# Patient Record
Sex: MALE | Race: WHITE | NOT HISPANIC OR LATINO | URBAN - METROPOLITAN AREA
[De-identification: names, ages, dates, MRNs, and addresses within clinical notes are randomized per-mention and may not be internally consistent; named-entity substitution may affect disease eponyms.]

---

## 2021-04-15 ENCOUNTER — INPATIENT (INPATIENT)
Facility: HOSPITAL | Age: 76
LOS: 4 days | Discharge: HOME | End: 2021-04-20
Attending: INTERNAL MEDICINE | Admitting: INTERNAL MEDICINE
Payer: MEDICARE

## 2021-04-15 VITALS
OXYGEN SATURATION: 100 % | DIASTOLIC BLOOD PRESSURE: 84 MMHG | RESPIRATION RATE: 20 BRPM | SYSTOLIC BLOOD PRESSURE: 135 MMHG | TEMPERATURE: 98 F | HEART RATE: 60 BPM

## 2021-04-15 DIAGNOSIS — R63.6 UNDERWEIGHT: ICD-10-CM

## 2021-04-15 DIAGNOSIS — Z90.79 ACQUIRED ABSENCE OF OTHER GENITAL ORGAN(S): Chronic | ICD-10-CM

## 2021-04-15 DIAGNOSIS — Z90.6 ACQUIRED ABSENCE OF OTHER PARTS OF URINARY TRACT: Chronic | ICD-10-CM

## 2021-04-15 LAB
ALBUMIN SERPL ELPH-MCNC: 4.1 G/DL — SIGNIFICANT CHANGE UP (ref 3.5–5.2)
ALP SERPL-CCNC: 67 U/L — SIGNIFICANT CHANGE UP (ref 30–115)
ALT FLD-CCNC: 19 U/L — SIGNIFICANT CHANGE UP (ref 0–41)
ANION GAP SERPL CALC-SCNC: 10 MMOL/L — SIGNIFICANT CHANGE UP (ref 7–14)
AST SERPL-CCNC: 28 U/L — SIGNIFICANT CHANGE UP (ref 0–41)
BASOPHILS # BLD AUTO: 0 K/UL — SIGNIFICANT CHANGE UP (ref 0–0.2)
BASOPHILS NFR BLD AUTO: 0 % — SIGNIFICANT CHANGE UP (ref 0–1)
BILIRUB SERPL-MCNC: 0.4 MG/DL — SIGNIFICANT CHANGE UP (ref 0.2–1.2)
BLD GP AB SCN SERPL QL: SIGNIFICANT CHANGE UP
BLD GP AB SCN SERPL QL: SIGNIFICANT CHANGE UP
BUN SERPL-MCNC: 50 MG/DL — HIGH (ref 10–20)
CALCIUM SERPL-MCNC: 9 MG/DL — SIGNIFICANT CHANGE UP (ref 8.5–10.1)
CHLORIDE SERPL-SCNC: 105 MMOL/L — SIGNIFICANT CHANGE UP (ref 98–110)
CO2 SERPL-SCNC: 24 MMOL/L — SIGNIFICANT CHANGE UP (ref 17–32)
CREAT SERPL-MCNC: 1.5 MG/DL — SIGNIFICANT CHANGE UP (ref 0.7–1.5)
EOSINOPHIL # BLD AUTO: 0.01 K/UL — SIGNIFICANT CHANGE UP (ref 0–0.7)
EOSINOPHIL NFR BLD AUTO: 0.2 % — SIGNIFICANT CHANGE UP (ref 0–8)
GLUCOSE SERPL-MCNC: 109 MG/DL — HIGH (ref 70–99)
HCT VFR BLD CALC: 24 % — LOW (ref 42–52)
HGB BLD-MCNC: 7.6 G/DL — LOW (ref 14–18)
IMM GRANULOCYTES NFR BLD AUTO: 0.6 % — HIGH (ref 0.1–0.3)
INR BLD: 1.13 RATIO — SIGNIFICANT CHANGE UP (ref 0.65–1.3)
LACTATE SERPL-SCNC: 2.8 MMOL/L — HIGH (ref 0.7–2)
LIDOCAIN IGE QN: 42 U/L — SIGNIFICANT CHANGE UP (ref 7–60)
LYMPHOCYTES # BLD AUTO: 1.33 K/UL — SIGNIFICANT CHANGE UP (ref 1.2–3.4)
LYMPHOCYTES # BLD AUTO: 20.9 % — SIGNIFICANT CHANGE UP (ref 20.5–51.1)
MCHC RBC-ENTMCNC: 24 PG — LOW (ref 27–31)
MCHC RBC-ENTMCNC: 31.7 G/DL — LOW (ref 32–37)
MCV RBC AUTO: 75.7 FL — LOW (ref 80–94)
MONOCYTES # BLD AUTO: 0.41 K/UL — SIGNIFICANT CHANGE UP (ref 0.1–0.6)
MONOCYTES NFR BLD AUTO: 6.5 % — SIGNIFICANT CHANGE UP (ref 1.7–9.3)
NEUTROPHILS # BLD AUTO: 4.56 K/UL — SIGNIFICANT CHANGE UP (ref 1.4–6.5)
NEUTROPHILS NFR BLD AUTO: 71.8 % — SIGNIFICANT CHANGE UP (ref 42.2–75.2)
NRBC # BLD: 0 /100 WBCS — SIGNIFICANT CHANGE UP (ref 0–0)
PLATELET # BLD AUTO: 127 K/UL — LOW (ref 130–400)
POTASSIUM SERPL-MCNC: 4.6 MMOL/L — SIGNIFICANT CHANGE UP (ref 3.5–5)
POTASSIUM SERPL-SCNC: 4.6 MMOL/L — SIGNIFICANT CHANGE UP (ref 3.5–5)
PROT SERPL-MCNC: 6.6 G/DL — SIGNIFICANT CHANGE UP (ref 6–8)
PROTHROM AB SERPL-ACNC: 13 SEC — HIGH (ref 9.95–12.87)
RBC # BLD: 3.17 M/UL — LOW (ref 4.7–6.1)
RBC # FLD: 16.5 % — HIGH (ref 11.5–14.5)
SARS-COV-2 RNA SPEC QL NAA+PROBE: SIGNIFICANT CHANGE UP
SODIUM SERPL-SCNC: 139 MMOL/L — SIGNIFICANT CHANGE UP (ref 135–146)
WBC # BLD: 6.35 K/UL — SIGNIFICANT CHANGE UP (ref 4.8–10.8)
WBC # FLD AUTO: 6.35 K/UL — SIGNIFICANT CHANGE UP (ref 4.8–10.8)

## 2021-04-15 PROCEDURE — 99285 EMERGENCY DEPT VISIT HI MDM: CPT | Mod: CS,GC

## 2021-04-15 PROCEDURE — 93010 ELECTROCARDIOGRAM REPORT: CPT

## 2021-04-15 PROCEDURE — 99223 1ST HOSP IP/OBS HIGH 75: CPT | Mod: 25

## 2021-04-15 PROCEDURE — 74177 CT ABD & PELVIS W/CONTRAST: CPT | Mod: 26,MH

## 2021-04-15 PROCEDURE — 99406 BEHAV CHNG SMOKING 3-10 MIN: CPT

## 2021-04-15 RX ORDER — ONDANSETRON 8 MG/1
4 TABLET, FILM COATED ORAL ONCE
Refills: 0 | Status: COMPLETED | OUTPATIENT
Start: 2021-04-15 | End: 2021-04-15

## 2021-04-15 RX ORDER — MORPHINE SULFATE 50 MG/1
4 CAPSULE, EXTENDED RELEASE ORAL ONCE
Refills: 0 | Status: DISCONTINUED | OUTPATIENT
Start: 2021-04-15 | End: 2021-04-15

## 2021-04-15 RX ORDER — PANTOPRAZOLE SODIUM 20 MG/1
8 TABLET, DELAYED RELEASE ORAL
Qty: 80 | Refills: 0 | Status: DISCONTINUED | OUTPATIENT
Start: 2021-04-15 | End: 2021-04-16

## 2021-04-15 RX ORDER — SODIUM CHLORIDE 9 MG/ML
1000 INJECTION, SOLUTION INTRAVENOUS
Refills: 0 | Status: DISCONTINUED | OUTPATIENT
Start: 2021-04-15 | End: 2021-04-18

## 2021-04-15 RX ORDER — SODIUM CHLORIDE 9 MG/ML
1000 INJECTION INTRAMUSCULAR; INTRAVENOUS; SUBCUTANEOUS ONCE
Refills: 0 | Status: COMPLETED | OUTPATIENT
Start: 2021-04-15 | End: 2021-04-15

## 2021-04-15 RX ADMIN — SODIUM CHLORIDE 60 MILLILITER(S): 9 INJECTION, SOLUTION INTRAVENOUS at 19:20

## 2021-04-15 RX ADMIN — PANTOPRAZOLE SODIUM 10 MG/HR: 20 TABLET, DELAYED RELEASE ORAL at 23:27

## 2021-04-15 RX ADMIN — SODIUM CHLORIDE 2000 MILLILITER(S): 9 INJECTION INTRAMUSCULAR; INTRAVENOUS; SUBCUTANEOUS at 12:57

## 2021-04-15 RX ADMIN — MORPHINE SULFATE 4 MILLIGRAM(S): 50 CAPSULE, EXTENDED RELEASE ORAL at 12:57

## 2021-04-15 RX ADMIN — ONDANSETRON 4 MILLIGRAM(S): 8 TABLET, FILM COATED ORAL at 12:57

## 2021-04-15 NOTE — ED PROVIDER NOTE - NS ED ROS FT
Eyes:  No visual changes, eye pain or discharge.  ENMT:  No hearing changes, pain, no sore throat or runny nose, no difficulty swallowing  Cardiac:  No chest pain, SOB or edema. No chest pain with exertion.  Respiratory:  No cough or respiratory distress.    GI:  + large melonic bowel movements with Bright red blood as well x1.5 weeks. + Dry heaves, No nausea, diarrhea or abdominal pain.  :  urostomy bag  MS: No generalized weakness, No myalgia, joint pain or back pain.  Neuro:  No headache or weakness.  No LOC.  Skin:  No skin rash.   Endocrine: No history of thyroid disease or diabetes.

## 2021-04-15 NOTE — H&P ADULT - NSHPPHYSICALEXAM_GEN_ALL_CORE
General: WN/WD, appearing slightly pale  Neurology: A&Ox3, nonfocal, DE DIOS x 4  Head:  Normocephalic, atraumatic  ENT:  Mucosa moist, no ulcerations  Neck:  Supple, no sinuses or palpable masses  Lymphatic:  No palpable cervical, supraclavicular adenopathy  CV: RRR, S1S2, 3/6 systolic murmur  Abdominal: Soft, NT, ND no palpable mass, has ostomy bag in suprapubic region, JAGUAR was positive  MSK: No edema

## 2021-04-15 NOTE — H&P ADULT - NSHPLABSRESULTS_GEN_ALL_CORE
LABS/RADIOLOGY RESULTS:                          7.6    6.35  )-----------( 127      ( 15 Apr 2021 13:25 )             24.0   04-15    139  |  105  |  50<H>  ----------------------------<  109<H>  4.6   |  24  |  1.5    Ca    9.0      15 Apr 2021 13:25    TPro  6.6  /  Alb  4.1  /  TBili  0.4  /  DBili  x   /  AST  28  /  ALT  19  /  AlkPhos  67  04-15  Blood Cultures

## 2021-04-15 NOTE — ED PROVIDER NOTE - ATTENDING CONTRIBUTION TO CARE
76 yo M pmh as stated in chart pw rectal bleeding. Rectal bleeding since his surgery to remove his bladder. Has been worse the past few days and associated with generalized fatigue and lower abd pain. No fever/chills, no decrease uop, no hematuria, no skin changes around urostomy, no n/v, no palpitations, no cp, no sob, no headache.     CONSTITUTIONAL: Well-developed; well-nourished; in no acute distress. Sitting up and providing appropriate history and physical examination  SKIN: skin exam is warm and dry, no acute rash.  HEAD: Normocephalic; atraumatic.  EYES: PERRL, 3 mm bilateral, no nystagmus, EOM intact; conjunctiva and sclera clear.  ENT: No nasal discharge; airway clear.  NECK: Supple; non tender. + full passive ROM in all directions. No JVD  CARD: S1, S2 normal; no murmurs, gallops, or rubs. Regular rate and rhythm. + Symmetric Strong Pulses  RESP: No wheezes, rales or rhonchi. Good air movement bilaterally  ABD: +ttp over bilateral lower abd. soft; non-distended. No Rebound, No Guarding, No signs of peritonitis, No CVA tenderness. No pulsatile abdominal mass. + Strong and Symmetric Pulses. +melena on rectal exam. Urostomy in place draining normal colored urine, no skin changes or breakdown around urostomy site.  EXT: Normal ROM. No clubbing, cyanosis or edema. Dp and Pt Pulses intact. Cap refill less than 3 seconds  NEURO: CN 2-12 intact, normal finger to nose, normal romberg, stable gait, no sensory or motor deficits, Alert, oriented, grossly unremarkable. No Focal deficits. GCS 15. NIH 0  PSYCH: Cooperative, appropriate.

## 2021-04-15 NOTE — H&P ADULT - ATTENDING COMMENTS
74 YO M with a PMH of bladder/prostate CA s/p cystectomy and prostatectomy with removal of some lymph nodes who presents to the hospital with a c/o generalized weakness for the past x 1.5 weeks. Associated with melena and lower ABD pain. Denies any fevers/chills, hematemesis, or hematuria. In the ED, JAGUAR was positive for melena, Hgb is 7.6, T&S obtained, pt transfused 1 unit of PRBCs, and started on PPI. CT-AP w/ IV contrast was negative for acute process.     Physical exam shows pt in NAD. VSS, afebrile, not hypoxic on RA. A&Ox3. Non-focal neuro exam. Muscle strength/sensation intact. CTA B/L with no W/C/R. RRR, systolic murmur. ABD is soft and non-tender, normoactive BSs; ostomy in place. LEs without swelling. No rashes. Labs and radiology as above.     Microcytic anemia due to upper GIB. HD stable. IVFs (LR). Trend CBC. Anemia studies. PPI. Active T&S. Transfuse PRN for Hgb < 7. Two large bore IVs. NPO. GI consult.     DUSTIN vs CKD3, likely pre-renal; Doubt ATN. Send UA, urine lytes, urine pr:cr ratio, and trend BMP. IVFs (LR). Monitor urinary out-put. Hold nephrotoxic agents.     Lactate elevation. IVFs (LR). Repeat lactate level in the AM.     HX of bladder/prostate CA s/p cystectomy and prostatectomy with removal of some lymph nodes. Restart home meds, except as stated above. DVT PPX. Inform PCP of pt's admission to hospital. My note supersedes the residents note. 76 YO M with a PMH of bladder/prostate CA s/p cystectomy and prostatectomy with removal of some lymph nodes who presents to the hospital with a c/o generalized weakness for the past x 1.5 weeks. Associated with melena and lower ABD pain. Denies any fevers/chills, hematemesis, or hematuria. In the ED, JAGUAR was positive for melena, Hgb is 7.6, T&S obtained, pt transfused 1 unit of PRBCs, and started on PPI. CT-AP w/ IV contrast was negative for acute process.     Physical exam shows pt in NAD. VSS, afebrile, not hypoxic on RA. A&Ox3. Non-focal neuro exam. Muscle strength/sensation intact. CTA B/L with no W/C/R. RRR, systolic murmur. ABD is soft and non-tender, normoactive BSs; ostomy in place. LEs without swelling. No rashes. Labs and radiology as above.     Microcytic anemia due to upper GIB. HD stable. IVFs (LR). Trend CBC. Anemia studies. PPI. Active T&S. Transfuse PRN for Hgb < 7. Two large bore IVs. NPO. GI consult.     DUSTIN vs CKD3, likely pre-renal; Doubt ATN. Send UA, urine lytes, urine pr:cr ratio, and trend BMP. IVFs (LR). Monitor urinary out-put. Hold nephrotoxic agents.     Lactate elevation. IVFs (LR). Repeat lactate level in the AM.     Tobacco abuse with counseling. Pt extensively counseled on the benefits of smoking cessation and alternative therapies. Counseled for 15-20 minutes. Pt would like to think about their options prior to making a decision.     HX of bladder/prostate CA s/p cystectomy and prostatectomy with removal of some lymph nodes. Restart home meds, except as stated above. DVT PPX. Inform PCP of pt's admission to hospital. My note supersedes the residents note.

## 2021-04-15 NOTE — ED PROVIDER NOTE - PHYSICAL EXAMINATION
CONSTITUTIONAL: Well-developed; well-nourished; intermittent visible discomfort with intermittent dry heaves observed.   SKIN: warm, dry  HEAD: Normocephalic; atraumatic.  EYES: EOMI, pale conjuctiva.   ENT: No nasal discharge; airway clear.  NECK: Supple; non tender.  CARD: S1, S2 normal;  Regular rate and rhythm.   RESP: No wheezes, rales or rhonchi.  ABD: soft, + ttp RLQ. non distended, no rebound or guarding, urostomy RLQ  EXT: Normal ROM.  5/5 strength in all 4 extremities   LYMPH: No acute cervical adenopathy.  NEURO: Alert, oriented, grossly unremarkable. neurovascularly intact  PSYCH: Cooperative, appropriate.

## 2021-04-15 NOTE — H&P ADULT - ASSESSMENT
75 year old male with history of bladder and prostate cancer s/p cystectomy and prostatectomy with removal of some lymph nodes presents with generalized weakness and black stools    # Generalized weakness and black stools likely secondary to symptomatic anemia from UGIB  - hgb 7.6, no baseline // patient states he has gotten BRBPR on and off for years however he has been having hard black stools for a week and a half // never had EGD and colonoscopy, had positive JAGUAR in the ED // no hematemesis  - BUN 50, patient states he has not been eating very much and not on diuretics, most likely secondary to UGIB  - started on protonix infusion, will keep NPO after midnight for possible EGD tomorrow  - will transfuse 1 unit prbc as requested by GI, anemia appears symptomatic  - starting LR at 60, lactate 2.8 // HD stable  - anemia microcytic, likely component of chronic GI bleed    # Hx of bladder and prostate cancer s/p cystectomy and prostatectomy  - no suprapubic pain, ostomy bag draining yellow urine, f/u PSA    # Thrombocytopenia  - splenomegaly seen on CT, thrombocytopenia likely component of consumption from blood loss and possibly sequestration  - will treat bleeding for now and monitor    # Systolic murmur  - 3/6 murmur heard on exam, patient does not follow with any doctors  - will order 2d echo to help guide fluid resuscitation, should not delay EGD  - f/u EKG    PLAN: give one unit prbc, EGD in AM    # DVT PPX: SCDs  # GI PPX: protonix drip  # Diet: Clear fluids for now  FULL CODE

## 2021-04-15 NOTE — H&P ADULT - NSHPSOCIALHISTORY_GEN_ALL_CORE
former cigarette smoker for 60 years, currently smokes about 3 joints of marijuana a day, no alcohol use, no illicit drug abuse

## 2021-04-15 NOTE — ED PROVIDER NOTE - OBJECTIVE STATEMENT
Patient is a 75 year old male, pmh htn, bladder cancer s/p removal of bladder, prostate and some lymphnodes presenting today with multiple medical complains, principally evaluation of rectal bleeding. Patient reports he has had rectal bleeding for since his bladder removal surgery, reporting that the bleeding comes and goes Patient is a 75 year old male, pmh htn, bladder cancer s/p removal of bladder, prostate and some lymphnodes presenting today with multiple medical complains, principally evaluation of rectal bleeding. Patient reports he has had rectal bleeding for since his bladder removal surgery, reporting that the bleeding comes and goes and has not had GI intervention. Patient reports that 1.5 weeks ago he had a large black bowel movement which ended with bright red blood. he reports that throughout the week he continued to have similar bowel movents. he reports 1 week ago he developed generalized lower abdominal pain, cramping in nature, non radiating, 9/10 intensity with no clear aggrevating or alleviating factors. he reports that around the same time he became very weak and for 1 week he has essentially been unable to get out of bed. he reports that given everything above, he has been eating minimally and has not had bowel movements. he reports dry heaves but denies nausea. he reports that he is eating only ice. he denied fevers, chills. Presented to the hospital on insistence of his girlfriend.     He reports he is without cancer treatment as it was "fixed" reporting he is not taking any medication. Patient reports 1.5ppd x 60 years and smoking 2 joints of marijuana /day.

## 2021-04-15 NOTE — H&P ADULT - HISTORY OF PRESENT ILLNESS
75 year old male with history of bladder and prostate cancer s/p cystectomy and prostatectomy with removal of some lymph nodes presents with generalized weakness and black stools.  Patient states he has had bright red blood in his stools for years stating that he thinks he had hemorrhoids.  He does not follow up with any doctors as an outpatient.  About 1.5 weeks ago he noticed that his stools were hard and black, sometimes surrounded by bright red blood.  Of note he has been feeling weaker lately with some difficulty getting out of bed.  At home he would have gas pains that improved with repositioning, but is not having any abdominal pain now.  He states that he never had an EGD or colonoscopy.  He currently denies any chest pain, denies abdominal pain, denies hematemesis.    In the ED he received a dose of morphine, zofran, 1L NS bolus, and started on protonix infusion.    Triage vitals: /84  HR 60  RR 20  Temp 98  SpO2 100% on room air

## 2021-04-15 NOTE — ED PROVIDER NOTE - CLINICAL SUMMARY MEDICAL DECISION MAKING FREE TEXT BOX
I personally evaluated the patient. I reviewed the Resident’s or Physician Assistant’s note (as assigned above), and agree with the findings and plan except as documented in my note. Patient evaluated for rectal bleed. Labs, CT abd pelvis performed in ED. VS stable, melena noted on exam. Hgb dropped from baseline but >7. Admitted to medicine for further evaluation and treatment.

## 2021-04-16 LAB
ALBUMIN SERPL ELPH-MCNC: 3.5 G/DL — SIGNIFICANT CHANGE UP (ref 3.5–5.2)
ALBUMIN SERPL ELPH-MCNC: 3.6 G/DL — SIGNIFICANT CHANGE UP (ref 3.5–5.2)
ALP SERPL-CCNC: 64 U/L — SIGNIFICANT CHANGE UP (ref 30–115)
ALP SERPL-CCNC: 65 U/L — SIGNIFICANT CHANGE UP (ref 30–115)
ALT FLD-CCNC: 17 U/L — SIGNIFICANT CHANGE UP (ref 0–41)
ALT FLD-CCNC: 19 U/L — SIGNIFICANT CHANGE UP (ref 0–41)
ANION GAP SERPL CALC-SCNC: 10 MMOL/L — SIGNIFICANT CHANGE UP (ref 7–14)
ANION GAP SERPL CALC-SCNC: 9 MMOL/L — SIGNIFICANT CHANGE UP (ref 7–14)
APTT BLD: 27.9 SEC — SIGNIFICANT CHANGE UP (ref 27–39.2)
AST SERPL-CCNC: 30 U/L — SIGNIFICANT CHANGE UP (ref 0–41)
AST SERPL-CCNC: 30 U/L — SIGNIFICANT CHANGE UP (ref 0–41)
BASOPHILS # BLD AUTO: 0 K/UL — SIGNIFICANT CHANGE UP (ref 0–0.2)
BASOPHILS NFR BLD AUTO: 0 % — SIGNIFICANT CHANGE UP (ref 0–1)
BILIRUB SERPL-MCNC: 0.9 MG/DL — SIGNIFICANT CHANGE UP (ref 0.2–1.2)
BILIRUB SERPL-MCNC: 0.9 MG/DL — SIGNIFICANT CHANGE UP (ref 0.2–1.2)
BUN SERPL-MCNC: 34 MG/DL — HIGH (ref 10–20)
BUN SERPL-MCNC: 37 MG/DL — HIGH (ref 10–20)
CALCIUM SERPL-MCNC: 8.6 MG/DL — SIGNIFICANT CHANGE UP (ref 8.5–10.1)
CALCIUM SERPL-MCNC: 8.7 MG/DL — SIGNIFICANT CHANGE UP (ref 8.5–10.1)
CHLORIDE SERPL-SCNC: 107 MMOL/L — SIGNIFICANT CHANGE UP (ref 98–110)
CHLORIDE SERPL-SCNC: 109 MMOL/L — SIGNIFICANT CHANGE UP (ref 98–110)
CO2 SERPL-SCNC: 23 MMOL/L — SIGNIFICANT CHANGE UP (ref 17–32)
CO2 SERPL-SCNC: 24 MMOL/L — SIGNIFICANT CHANGE UP (ref 17–32)
COVID-19 SPIKE DOMAIN AB INTERP: POSITIVE
COVID-19 SPIKE DOMAIN ANTIBODY RESULT: 126 U/ML — HIGH
CREAT SERPL-MCNC: 1.3 MG/DL — SIGNIFICANT CHANGE UP (ref 0.7–1.5)
CREAT SERPL-MCNC: 1.5 MG/DL — SIGNIFICANT CHANGE UP (ref 0.7–1.5)
EOSINOPHIL # BLD AUTO: 0.03 K/UL — SIGNIFICANT CHANGE UP (ref 0–0.7)
EOSINOPHIL NFR BLD AUTO: 0.9 % — SIGNIFICANT CHANGE UP (ref 0–8)
GLUCOSE SERPL-MCNC: 87 MG/DL — SIGNIFICANT CHANGE UP (ref 70–99)
GLUCOSE SERPL-MCNC: 93 MG/DL — SIGNIFICANT CHANGE UP (ref 70–99)
HCT VFR BLD CALC: 21.1 % — LOW (ref 42–52)
HCT VFR BLD CALC: 21.7 % — LOW (ref 42–52)
HCT VFR BLD CALC: 22.2 % — LOW (ref 42–52)
HCV AB S/CO SERPL IA: 48.09 COI — HIGH
HCV AB SERPL-IMP: REACTIVE
HGB BLD-MCNC: 6.5 G/DL — CRITICAL LOW (ref 14–18)
HGB BLD-MCNC: 7 G/DL — LOW (ref 14–18)
HGB BLD-MCNC: 7.2 G/DL — LOW (ref 14–18)
IMM GRANULOCYTES NFR BLD AUTO: 0.6 % — HIGH (ref 0.1–0.3)
INR BLD: 1.05 RATIO — SIGNIFICANT CHANGE UP (ref 0.65–1.3)
LACTATE SERPL-SCNC: 0.8 MMOL/L — SIGNIFICANT CHANGE UP (ref 0.7–2)
LYMPHOCYTES # BLD AUTO: 0.78 K/UL — LOW (ref 1.2–3.4)
LYMPHOCYTES # BLD AUTO: 22.8 % — SIGNIFICANT CHANGE UP (ref 20.5–51.1)
MAGNESIUM SERPL-MCNC: 1.9 MG/DL — SIGNIFICANT CHANGE UP (ref 1.8–2.4)
MAGNESIUM SERPL-MCNC: 1.9 MG/DL — SIGNIFICANT CHANGE UP (ref 1.8–2.4)
MCHC RBC-ENTMCNC: 23.7 PG — LOW (ref 27–31)
MCHC RBC-ENTMCNC: 25.4 PG — LOW (ref 27–31)
MCHC RBC-ENTMCNC: 25.5 PG — LOW (ref 27–31)
MCHC RBC-ENTMCNC: 30.8 G/DL — LOW (ref 32–37)
MCHC RBC-ENTMCNC: 32.3 G/DL — SIGNIFICANT CHANGE UP (ref 32–37)
MCHC RBC-ENTMCNC: 32.4 G/DL — SIGNIFICANT CHANGE UP (ref 32–37)
MCV RBC AUTO: 77 FL — LOW (ref 80–94)
MCV RBC AUTO: 78.4 FL — LOW (ref 80–94)
MCV RBC AUTO: 78.9 FL — LOW (ref 80–94)
MONOCYTES # BLD AUTO: 0.23 K/UL — SIGNIFICANT CHANGE UP (ref 0.1–0.6)
MONOCYTES NFR BLD AUTO: 6.7 % — SIGNIFICANT CHANGE UP (ref 1.7–9.3)
NEUTROPHILS # BLD AUTO: 2.36 K/UL — SIGNIFICANT CHANGE UP (ref 1.4–6.5)
NEUTROPHILS NFR BLD AUTO: 69 % — SIGNIFICANT CHANGE UP (ref 42.2–75.2)
NRBC # BLD: 0 /100 WBCS — SIGNIFICANT CHANGE UP (ref 0–0)
PHOSPHATE SERPL-MCNC: 3.7 MG/DL — SIGNIFICANT CHANGE UP (ref 2.1–4.9)
PLATELET # BLD AUTO: 90 K/UL — LOW (ref 130–400)
PLATELET # BLD AUTO: 90 K/UL — LOW (ref 130–400)
PLATELET # BLD AUTO: 94 K/UL — LOW (ref 130–400)
POTASSIUM SERPL-MCNC: 4.2 MMOL/L — SIGNIFICANT CHANGE UP (ref 3.5–5)
POTASSIUM SERPL-MCNC: 4.3 MMOL/L — SIGNIFICANT CHANGE UP (ref 3.5–5)
POTASSIUM SERPL-SCNC: 4.2 MMOL/L — SIGNIFICANT CHANGE UP (ref 3.5–5)
POTASSIUM SERPL-SCNC: 4.3 MMOL/L — SIGNIFICANT CHANGE UP (ref 3.5–5)
PROT SERPL-MCNC: 5.6 G/DL — LOW (ref 6–8)
PROT SERPL-MCNC: 6 G/DL — SIGNIFICANT CHANGE UP (ref 6–8)
PROTHROM AB SERPL-ACNC: 12.1 SEC — SIGNIFICANT CHANGE UP (ref 9.95–12.87)
PSA FLD-MCNC: 0.02 NG/ML — SIGNIFICANT CHANGE UP (ref 0–4)
RBC # BLD: 2.74 M/UL — LOW (ref 4.7–6.1)
RBC # BLD: 2.75 M/UL — LOW (ref 4.7–6.1)
RBC # BLD: 2.75 M/UL — LOW (ref 4.7–6.1)
RBC # BLD: 2.83 M/UL — LOW (ref 4.7–6.1)
RBC # FLD: 16.5 % — HIGH (ref 11.5–14.5)
RBC # FLD: 16.6 % — HIGH (ref 11.5–14.5)
RBC # FLD: 16.9 % — HIGH (ref 11.5–14.5)
RETICS #: 91.3 K/UL — SIGNIFICANT CHANGE UP (ref 25–125)
RETICS/RBC NFR: 3.3 % — HIGH (ref 0.5–1.5)
SARS-COV-2 IGG+IGM SERPL QL IA: 126 U/ML — HIGH
SARS-COV-2 IGG+IGM SERPL QL IA: POSITIVE
SODIUM SERPL-SCNC: 140 MMOL/L — SIGNIFICANT CHANGE UP (ref 135–146)
SODIUM SERPL-SCNC: 142 MMOL/L — SIGNIFICANT CHANGE UP (ref 135–146)
WBC # BLD: 3.42 K/UL — LOW (ref 4.8–10.8)
WBC # BLD: 3.45 K/UL — LOW (ref 4.8–10.8)
WBC # BLD: 4.11 K/UL — LOW (ref 4.8–10.8)
WBC # FLD AUTO: 3.42 K/UL — LOW (ref 4.8–10.8)
WBC # FLD AUTO: 3.45 K/UL — LOW (ref 4.8–10.8)
WBC # FLD AUTO: 4.11 K/UL — LOW (ref 4.8–10.8)

## 2021-04-16 PROCEDURE — 99233 SBSQ HOSP IP/OBS HIGH 50: CPT

## 2021-04-16 PROCEDURE — 99223 1ST HOSP IP/OBS HIGH 75: CPT

## 2021-04-16 RX ORDER — SENNA PLUS 8.6 MG/1
2 TABLET ORAL AT BEDTIME
Refills: 0 | Status: DISCONTINUED | OUTPATIENT
Start: 2021-04-16 | End: 2021-04-20

## 2021-04-16 RX ORDER — PANTOPRAZOLE SODIUM 20 MG/1
40 TABLET, DELAYED RELEASE ORAL EVERY 12 HOURS
Refills: 0 | Status: DISCONTINUED | OUTPATIENT
Start: 2021-04-16 | End: 2021-04-20

## 2021-04-16 RX ORDER — POLYETHYLENE GLYCOL 3350 17 G/17G
17 POWDER, FOR SOLUTION ORAL
Refills: 0 | Status: DISCONTINUED | OUTPATIENT
Start: 2021-04-16 | End: 2021-04-20

## 2021-04-16 RX ADMIN — PANTOPRAZOLE SODIUM 10 MG/HR: 20 TABLET, DELAYED RELEASE ORAL at 08:40

## 2021-04-16 RX ADMIN — POLYETHYLENE GLYCOL 3350 17 GRAM(S): 17 POWDER, FOR SOLUTION ORAL at 17:12

## 2021-04-16 RX ADMIN — PANTOPRAZOLE SODIUM 40 MILLIGRAM(S): 20 TABLET, DELAYED RELEASE ORAL at 17:12

## 2021-04-16 RX ADMIN — Medication 5 MILLIGRAM(S): at 17:12

## 2021-04-16 NOTE — CHART NOTE - NSCHARTNOTEFT_GEN_A_CORE
Event:  Pt c/o     Brief HPI: 75 year old male with history of bladder and prostate cancer s/p cystectomy and prostatectomy 2 years ago, no chemoradiation with removal of some lymph nodes presents with generalized weakness and dark stools.  Patient states he has had constipation and bright red blood in his stools for years stating that he thinks he had hemorrhoids.  He does not follow up with any doctors as an outpatient.  About 1.5 weeks ago he noticed that his stools were hard and black, sometimes surrounded by bright red blood.  Of note he has been feeling weaker lately with some difficulty getting out of bed.  At home he would have gas pains that improved with repositioning, but is not having any abdominal pain now.  He states that he never had colonoscopy 2 years ago, unremarkable as per patient.  He currently denies any chest pain, denies abdominal pain, denies hematemesis.    In the ED he received a dose of morphine, zofran, 1L NS bolus, and started on protonix infusion.    Triage vitals: /84  HR 60  RR 20  Temp 98  SpO2 100% on room air     Prior records Reviewed (Y/N): Y  History obtained from person other than patient (Y/N): N    Prior EGD: Never  Prior Colonoscopy: colonoscopy 2 years ago, unremarkable as per patient.        INTERVAL EVENTS: Patient received 1 unit PRBC this morning. Repeat Hgb 7.2. GI Follow up appreciated;     Objective: Vital signs last  24hrs  Vital Signs Last 24 Hrs  T(C): 36.1 (16 Apr 2021 14:15), Max: 37.1 (15 Apr 2021 18:52)  T(F): 96.9 (16 Apr 2021 14:15), Max: 98.7 (15 Apr 2021 18:52)  HR: 79 (16 Apr 2021 14:15) (71 - 85)  BP: 131/62 (16 Apr 2021 14:15) (128/70 - 185/77)  BP(mean): --  RR: 20 (16 Apr 2021 14:15) (16 - 20)  SpO2: 97% (16 Apr 2021 05:25) (97% - 100%)    MEDICATIONS  (STANDING):  bisacodyl 5 milliGRAM(s) Oral every 12 hours  lactated ringers. 1000 milliLiter(s) (60 mL/Hr) IV Continuous <Continuous>  pantoprazole  Injectable 40 milliGRAM(s) IV Push every 12 hours  polyethylene glycol 3350 17 Gram(s) Oral two times a day  senna 2 Tablet(s) Oral at bedtime    MEDICATIONS  (PRN):    Labs:                         7.2    3.45  )-----------( 90       ( 16 Apr 2021 11:16 )             22.2   04-16    140  |  107  |  34<H>  ----------------------------<  93  4.3   |  24  |  1.5    Ca    8.7      16 Apr 2021 11:16  Phos  3.7     04-16  Mg     1.9     04-16    TPro  6.0  /  Alb  3.6  /  TBili  0.9  /  DBili  x   /  AST  30  /  ALT  19  /  AlkPhos  65  04-16    Imaging:  < from: CT Abdomen and Pelvis w/ IV Cont (04.15.21 @ 15:57) >      IMPRESSION:  1.  No CT evidence of an acute abdominopelvic pathology.  2.  Post cystectomy with a right lower quadrant ileal conduit.  3.  Partiallyimaged right hydrocele.  4.  Mild splenomegaly.    < end of copied text >      A/P:  Pt is a 75 year old male with history of bladder and prostate cancer s/p cystectomy and prostatectomy 2 years ago, no chemoradiation with removal of some lymph nodes presents with generalized weakness and dark stools    Plan:    1. Appreciate GI Recs; s/p 1 unit today; Reordered 1 unit PRBC this afternoon to keep Hgb>8             2. Patient c/o constipation; Started on bowel regimen Senna, Miralax, Dulcolax             3.Follow up CBC after transfusion, scheduled for 23:30; Keep Hgb>8             4. Changed Protonix Drip to Protonix IV 40mg IVP BID             5. Plan for colonoscopy next week per GI; Follow up Monday for further recs; Changed diet from NPO to clear liquid;              6. Monitor for signs of bleeding;              7. Continue LR at 60, as above, monitor renal function and adjust PRN  -Discussed plan with attending who agrees with above. UPDATES      Brief HPI: 75 year old male with history of bladder and prostate cancer s/p cystectomy and prostatectomy 2 years ago, no chemoradiation with removal of some lymph nodes presents with generalized weakness and dark stools.  Patient states he has had constipation and bright red blood in his stools for years stating that he thinks he had hemorrhoids.  He does not follow up with any doctors as an outpatient.  About 1.5 weeks ago he noticed that his stools were hard and black, sometimes surrounded by bright red blood.  Of note he has been feeling weaker lately with some difficulty getting out of bed.  At home he would have gas pains that improved with repositioning, but is not having any abdominal pain now.  He states that he never had colonoscopy 2 years ago, unremarkable as per patient.  He currently denies any chest pain, denies abdominal pain, denies hematemesis.    In the ED he received a dose of morphine, zofran, 1L NS bolus, and started on protonix infusion.    Triage vitals: /84  HR 60  RR 20  Temp 98  SpO2 100% on room air     Prior records Reviewed (Y/N): Y  History obtained from person other than patient (Y/N): N    Prior EGD: Never  Prior Colonoscopy: colonoscopy 2 years ago, unremarkable as per patient.        INTERVAL EVENTS: Patient received 1 unit PRBC this morning. Repeat Hgb 7.2. GI Follow up appreciated;     Objective: Vital signs last  24hrs  Vital Signs Last 24 Hrs  T(C): 36.1 (16 Apr 2021 14:15), Max: 37.1 (15 Apr 2021 18:52)  T(F): 96.9 (16 Apr 2021 14:15), Max: 98.7 (15 Apr 2021 18:52)  HR: 79 (16 Apr 2021 14:15) (71 - 85)  BP: 131/62 (16 Apr 2021 14:15) (128/70 - 185/77)  BP(mean): --  RR: 20 (16 Apr 2021 14:15) (16 - 20)  SpO2: 97% (16 Apr 2021 05:25) (97% - 100%)    MEDICATIONS  (STANDING):  bisacodyl 5 milliGRAM(s) Oral every 12 hours  lactated ringers. 1000 milliLiter(s) (60 mL/Hr) IV Continuous <Continuous>  pantoprazole  Injectable 40 milliGRAM(s) IV Push every 12 hours  polyethylene glycol 3350 17 Gram(s) Oral two times a day  senna 2 Tablet(s) Oral at bedtime    MEDICATIONS  (PRN):    Labs:                         7.2    3.45  )-----------( 90       ( 16 Apr 2021 11:16 )             22.2   04-16    140  |  107  |  34<H>  ----------------------------<  93  4.3   |  24  |  1.5    Ca    8.7      16 Apr 2021 11:16  Phos  3.7     04-16  Mg     1.9     04-16    TPro  6.0  /  Alb  3.6  /  TBili  0.9  /  DBili  x   /  AST  30  /  ALT  19  /  AlkPhos  65  04-16    Imaging:  < from: CT Abdomen and Pelvis w/ IV Cont (04.15.21 @ 15:57) >      IMPRESSION:  1.  No CT evidence of an acute abdominopelvic pathology.  2.  Post cystectomy with a right lower quadrant ileal conduit.  3.  Partiallyimaged right hydrocele.  4.  Mild splenomegaly.    < end of copied text >      A/P:  Pt is a 75 year old male with history of bladder and prostate cancer s/p cystectomy and prostatectomy 2 years ago, no chemoradiation with removal of some lymph nodes presents with generalized weakness and dark stools    Plan:    1. Appreciate GI Recs; s/p 1 unit today; Reordered 1 unit PRBC this afternoon to keep Hgb>8             2. Patient c/o constipation; Started on bowel regimen Senna, Miralax, Dulcolax             3.Follow up CBC after transfusion, scheduled for 23:30; Keep Hgb>8             4. Changed Protonix Drip to Protonix IV 40mg IVP BID             5. Plan for colonoscopy next week per GI; Follow up Monday for further recs; Changed diet from NPO to clear liquid;              6. Monitor for signs of bleeding;              7. Continue LR at 60, as above, monitor renal function and adjust PRN  -Discussed plan with attending who agrees with above.

## 2021-04-16 NOTE — CHART NOTE - NSCHARTNOTEFT_GEN_A_CORE
Patient's health care proxy Jen visited patient at bedside today, brought HCP form and patient belongings.  HCP verified with patient, who agrees Jen is the HCP. HCP forms placed in chart.   Jen reported that patient has a history of using laxatives, enemas and suppositories for over 40+years. She thinks his condition is exacerbated by the ongoing presentation. Updated her on patient status and condition.

## 2021-04-16 NOTE — PROGRESS NOTE ADULT - ASSESSMENT
· Assessment	  75 year old male with history of bladder and prostate cancer s/p cystectomy and prostatectomy with removal of some lymph nodes presents with generalized weakness and black stools    # Generalized weakness and black stools likely secondary to symptomatic anemia from UGIB  - hgb 7.6, no baseline // patient states he has gotten BRBPR on and off for years however he has been having hard black stools for a week and a half // never had EGD and colonoscopy, had positive JAGUAR in the ED // no hematemesis  - BUN 50, patient states he has not been eating very much and not on diuretics, most likely secondary to UGIB  - started on protonix infusion, will keep NPO after midnight for possible EGD tomorrow  - will transfuse 1 unit prbc as requested by GI, anemia appears symptomatic  - starting LR at 60, lactate 2.8 // HD stable  - anemia microcytic, likely component of chronic GI bleed    # Hx of bladder and prostate cancer s/p cystectomy and prostatectomy  - no suprapubic pain, ostomy bag draining yellow urine, f/u PSA    # Thrombocytopenia  - splenomegaly seen on CT, thrombocytopenia likely component of consumption from blood loss and possibly sequestration  - will treat bleeding for now and monitor    # Systolic murmur  - 3/6 murmur heard on exam, patient does not follow with any doctors  - will order 2d echo to help guide fluid resuscitation, should not delay EGD  - f/u EKG    PLAN: give one unit prbc, EGD in AM    # DVT PPX: SCDs  # GI PPX: protonix drip  # Diet: Clear fluids for now  FULL CODE

## 2021-04-16 NOTE — CONSULT NOTE ADULT - SUBJECTIVE AND OBJECTIVE BOX
Gastroenterology Consultation:    Patient is a 75y old  Male who presents with a chief complaint of generalized weakness and black stool (15 Apr 2021 18:34)      Admitted on: 04-15-21  HPI:  75 year old male with history of bladder and prostate cancer s/p cystectomy and prostatectomy 2 years ago, no chemoradiation with removal of some lymph nodes presents with generalized weakness and dark stools.  Patient states he has had constipation and bright red blood in his stools for years stating that he thinks he had hemorrhoids.  He does not follow up with any doctors as an outpatient.  About 1.5 weeks ago he noticed that his stools were hard and black, sometimes surrounded by bright red blood.  Of note he has been feeling weaker lately with some difficulty getting out of bed.  At home he would have gas pains that improved with repositioning, but is not having any abdominal pain now.  He states that he never had colonoscopy 2 years ago, unremarkable as per patient.  He currently denies any chest pain, denies abdominal pain, denies hematemesis.    In the ED he received a dose of morphine, zofran, 1L NS bolus, and started on protonix infusion.    Triage vitals: /84  HR 60  RR 20  Temp 98  SpO2 100% on room air     Prior records Reviewed (Y/N): Y  History obtained from person other than patient (Y/N): N    Prior EGD: Never  Prior Colonoscopy: colonoscopy 2 years ago, unremarkable as per patient.        PAST MEDICAL & SURGICAL HISTORY:  Hypertension    Bladder cancer    H/O prostatectomy    H/O total cystectomy        FAMILY HISTORY:  non-contributory    Social History:  Tobacco: N  Alcohol: Socially  Drugs: marijuana     Home Medications:    MEDICATIONS  (STANDING):  lactated ringers. 1000 milliLiter(s) (60 mL/Hr) IV Continuous <Continuous>  pantoprazole Infusion 8 mG/Hr (10 mL/Hr) IV Continuous <Continuous>    MEDICATIONS  (PRN):      Allergies  No Known Allergies      Review of Systems:   Constitutional:  No Fever, No Chills  ENT/Mouth:  No Hearing Changes,  No Difficulty Swallowing  Eyes:  No Eye Pain, No Vision Changes  Cardiovascular:  No Chest Pain, No Palpitations  Respiratory:  No Cough, No Dyspnea  Gastrointestinal:  As described in HPI  Musculoskeletal:  No Joint Swelling, No Back Pain  Skin:  No Skin Lesions, No Jaundice  Neuro:  No Syncope, No Dizziness  Heme/Lymph:  No Bruising, No Bleeding.          Physical Examination:  T(C): 36.4 (04-16-21 @ 05:25), Max: 37.1 (04-15-21 @ 18:52)  HR: 79 (04-16-21 @ 05:25) (60 - 85)  BP: 137/53 (04-16-21 @ 05:25) (128/70 - 185/77)  RR: 18 (04-16-21 @ 05:25) (16 - 20)  SpO2: 97% (04-16-21 @ 05:25) (97% - 100%)  Height (cm): 177.8 (04-15-21 @ 21:34)  Weight (kg): 56.7 (04-15-21 @ 21:34)      Constitutional: No acute distress.  Eyes:. Conjunctivae are clear, Sclera is non-icteric.  Ears Nose and Throat: The external ears are normal appearing,  Oral mucosa is pink and moist.  Respiratory:  No signs of respiratory distress. Lung sounds are clear bilaterally.  Cardiovascular:  S1 S2, Regular rate and rhythm.  GI: Abdomen is soft, symmetric, and non-tender without distention. There are no visible lesions. Bowel sounds are present and normoactive in all four quadrants. No masses, hepatomegaly, or splenomegaly are noted. JAGUAR showed large external hemorrhoids and dark green stool.   Neuro: No Tremor, No involuntary movements            Data: (reviewed by attending)                        7.0    3.42  )-----------( 90       ( 16 Apr 2021 06:12 )             21.7     Hgb Trend:  7.0  04-16-21 @ 06:12  6.5  04-15-21 @ 22:23  7.6  04-15-21 @ 13:25      04-16    142  |  109  |  37<H>  ----------------------------<  87  4.2   |  23  |  1.3    Ca    8.6      16 Apr 2021 06:12  Mg     1.9     04-16    TPro  5.6<L>  /  Alb  3.5  /  TBili  0.9  /  DBili  x   /  AST  30  /  ALT  17  /  AlkPhos  64  04-16    Liver panel trend:  TBili 0.9   /   AST 30   /   ALT 17   /   AlkP 64   /   Tptn 5.6   /   Alb 3.5    /   DBili --      04-16  TBili 0.4   /   AST 28   /   ALT 19   /   AlkP 67   /   Tptn 6.6   /   Alb 4.1    /   DBili --      04-15      PT/INR - ( 15 Apr 2021 22:23 )   PT: 13.00 sec;   INR: 1.13 ratio                 Radiology:(reviewed by attending)  CT Abdomen and Pelvis w/ IV Cont:   EXAM:  CT ABDOMEN AND PELVIS IC            PROCEDURE DATE:  04/15/2021            INTERPRETATION:  CLINICAL STATEMENT: Generalized lower abdominal pain    TECHNIQUE: Contiguous axial CT images were obtained from the lower chest to the pubic symphysis following administration of 100cc Optiray 320 intravenous contrast.  Oral contrast was not administered.  Reformatted images in the coronal and sagittal planes were acquired.    COMPARISON CT: None.    OTHER STUDIES USED FOR CORRELATION: None.      FINDINGS:    LOWER CHEST: Bibasilar subsegmental dependent atelectasis.    HEPATOBILIARY: Punctate calcified right hepatic lobe granuloma.    SPLEEN: Mild splenomegaly measuring 15 cm in length.    PANCREAS: Unremarkable.    ADRENAL GLANDS: Unremarkable.    KIDNEYS: Symmetric renal enhancement bilaterally. No hydronephrosis. Subcentimeter left lower pole renal hypodensity, too small to characterize.    ABDOMINOPELVIC NODES: No lymphadenopathy.    PELVIC ORGANS: Post cystectomy with a right lower quadrant ileal conduit. Partially imaged large right-sided hydrocele.    PERITONEUM/MESENTERY/BOWEL: No bowel obstruction, ascites or pneumoperitoneum.    BONES/SOFT TISSUES: Osteopenia. Degenerative changes of the spine and hips noted.    OTHER: Diffuse calcified and noncalcified atherosclerotic plaque throughout the aorta and its major branches      IMPRESSION:  1.  No CT evidence of an acute abdominopelvic pathology.  2.  Post cystectomy with a right lower quadrant ileal conduit.  3.  Partiallyimaged right hydrocele.  4.  Mild splenomegaly.              MIKEY ISABEL MD; Attending Radiologist  This document has been electronically signed. Apr 15 2021  4:59PM (04-15-21 @ 15:57)

## 2021-04-16 NOTE — CONSULT NOTE ADULT - ASSESSMENT
75 year old male with history of bladder and prostate cancer s/p cystectomy and prostatectomy 2 years ago, no chemoradiation with removal of some lymph nodes presents with generalized weakness and dark stools.  Patient states he has had constipation and bright red blood in his stools for years stating that he thinks he had hemorrhoids.       # Microcytic anemia:  - hgb: 7.6--> 6.5  - VS stable  - No BM for the past 3 days as per patient  - JAGUAR showed large external hemorrhoids and dark green stool.   - s/p 1 UPRBC  - constipation  - h/o intermittent hemorrhoidal bleed    Rec;  -PPI BID  - Aggressive bowel regimen--> Miralax BID, Senna BID, Colace TID  - Monitor CBC  - Iron studies  - Keep active type and screen  - keep hgb>8  - will plan for EGD/colonoscopy next week

## 2021-04-17 LAB
ALBUMIN SERPL ELPH-MCNC: 3.7 G/DL — SIGNIFICANT CHANGE UP (ref 3.5–5.2)
ALP SERPL-CCNC: 75 U/L — SIGNIFICANT CHANGE UP (ref 30–115)
ALT FLD-CCNC: 24 U/L — SIGNIFICANT CHANGE UP (ref 0–41)
ANION GAP SERPL CALC-SCNC: 15 MMOL/L — HIGH (ref 7–14)
AST SERPL-CCNC: 42 U/L — HIGH (ref 0–41)
BILIRUB SERPL-MCNC: 1 MG/DL — SIGNIFICANT CHANGE UP (ref 0.2–1.2)
BUN SERPL-MCNC: 25 MG/DL — HIGH (ref 10–20)
CALCIUM SERPL-MCNC: 8.6 MG/DL — SIGNIFICANT CHANGE UP (ref 8.5–10.1)
CHLORIDE SERPL-SCNC: 108 MMOL/L — SIGNIFICANT CHANGE UP (ref 98–110)
CO2 SERPL-SCNC: 19 MMOL/L — SIGNIFICANT CHANGE UP (ref 17–32)
CREAT SERPL-MCNC: 1.4 MG/DL — SIGNIFICANT CHANGE UP (ref 0.7–1.5)
GLUCOSE SERPL-MCNC: 81 MG/DL — SIGNIFICANT CHANGE UP (ref 70–99)
HCT VFR BLD CALC: 26.3 % — LOW (ref 42–52)
HCT VFR BLD CALC: 32.2 % — LOW (ref 42–52)
HGB BLD-MCNC: 10.6 G/DL — LOW (ref 14–18)
HGB BLD-MCNC: 8.7 G/DL — LOW (ref 14–18)
MAGNESIUM SERPL-MCNC: 1.9 MG/DL — SIGNIFICANT CHANGE UP (ref 1.8–2.4)
MCHC RBC-ENTMCNC: 25.4 PG — LOW (ref 27–31)
MCHC RBC-ENTMCNC: 26 PG — LOW (ref 27–31)
MCHC RBC-ENTMCNC: 32.9 G/DL — SIGNIFICANT CHANGE UP (ref 32–37)
MCHC RBC-ENTMCNC: 33.1 G/DL — SIGNIFICANT CHANGE UP (ref 32–37)
MCV RBC AUTO: 76.9 FL — LOW (ref 80–94)
MCV RBC AUTO: 78.9 FL — LOW (ref 80–94)
NRBC # BLD: 0 /100 WBCS — SIGNIFICANT CHANGE UP (ref 0–0)
NRBC # BLD: 0 /100 WBCS — SIGNIFICANT CHANGE UP (ref 0–0)
PHOSPHATE SERPL-MCNC: 3.6 MG/DL — SIGNIFICANT CHANGE UP (ref 2.1–4.9)
PLATELET # BLD AUTO: 80 K/UL — LOW (ref 130–400)
PLATELET # BLD AUTO: 81 K/UL — LOW (ref 130–400)
POTASSIUM SERPL-MCNC: 4.3 MMOL/L — SIGNIFICANT CHANGE UP (ref 3.5–5)
POTASSIUM SERPL-SCNC: 4.3 MMOL/L — SIGNIFICANT CHANGE UP (ref 3.5–5)
PROT SERPL-MCNC: 6.1 G/DL — SIGNIFICANT CHANGE UP (ref 6–8)
RBC # BLD: 3.42 M/UL — LOW (ref 4.7–6.1)
RBC # BLD: 4.08 M/UL — LOW (ref 4.7–6.1)
RBC # FLD: 16.6 % — HIGH (ref 11.5–14.5)
RBC # FLD: 17.7 % — HIGH (ref 11.5–14.5)
SODIUM SERPL-SCNC: 142 MMOL/L — SIGNIFICANT CHANGE UP (ref 135–146)
WBC # BLD: 3.17 K/UL — LOW (ref 4.8–10.8)
WBC # BLD: 3.4 K/UL — LOW (ref 4.8–10.8)
WBC # FLD AUTO: 3.17 K/UL — LOW (ref 4.8–10.8)
WBC # FLD AUTO: 3.4 K/UL — LOW (ref 4.8–10.8)

## 2021-04-17 PROCEDURE — 99233 SBSQ HOSP IP/OBS HIGH 50: CPT

## 2021-04-17 PROCEDURE — 93306 TTE W/DOPPLER COMPLETE: CPT | Mod: 26

## 2021-04-17 RX ORDER — HYDRALAZINE HCL 50 MG
5 TABLET ORAL ONCE
Refills: 0 | Status: DISCONTINUED | OUTPATIENT
Start: 2021-04-17 | End: 2021-04-18

## 2021-04-17 RX ORDER — LISINOPRIL 2.5 MG/1
10 TABLET ORAL DAILY
Refills: 0 | Status: DISCONTINUED | OUTPATIENT
Start: 2021-04-17 | End: 2021-04-20

## 2021-04-17 RX ORDER — LANOLIN ALCOHOL/MO/W.PET/CERES
5 CREAM (GRAM) TOPICAL AT BEDTIME
Refills: 0 | Status: DISCONTINUED | OUTPATIENT
Start: 2021-04-17 | End: 2021-04-20

## 2021-04-17 RX ORDER — HYDRALAZINE HCL 50 MG
50 TABLET ORAL THREE TIMES A DAY
Refills: 0 | Status: DISCONTINUED | OUTPATIENT
Start: 2021-04-17 | End: 2021-04-20

## 2021-04-17 RX ADMIN — Medication 5 MILLIGRAM(S): at 17:27

## 2021-04-17 RX ADMIN — LISINOPRIL 10 MILLIGRAM(S): 2.5 TABLET ORAL at 19:39

## 2021-04-17 RX ADMIN — POLYETHYLENE GLYCOL 3350 17 GRAM(S): 17 POWDER, FOR SOLUTION ORAL at 17:27

## 2021-04-17 RX ADMIN — PANTOPRAZOLE SODIUM 40 MILLIGRAM(S): 20 TABLET, DELAYED RELEASE ORAL at 05:05

## 2021-04-17 RX ADMIN — Medication 5 MILLIGRAM(S): at 21:53

## 2021-04-17 RX ADMIN — SODIUM CHLORIDE 60 MILLILITER(S): 9 INJECTION, SOLUTION INTRAVENOUS at 15:47

## 2021-04-17 RX ADMIN — Medication 50 MILLIGRAM(S): at 21:54

## 2021-04-17 RX ADMIN — PANTOPRAZOLE SODIUM 40 MILLIGRAM(S): 20 TABLET, DELAYED RELEASE ORAL at 17:27

## 2021-04-17 NOTE — DIETITIAN INITIAL EVALUATION ADULT. - PERSON TAUGHT/METHOD
Discussed diet for clear liquid diet with pt. and answered questions. Pt. receptive to RD ed./verbal instruction/patient instructed

## 2021-04-17 NOTE — PROGRESS NOTE ADULT - ASSESSMENT
· Assessment	  75 year old male with history of bladder and prostate cancer s/p cystectomy and prostatectomy with removal of some lymph nodes presents with generalized weakness and black stools    # Generalized weakness and black stools likely secondary to symptomatic anemia from UGIB  - hgb 7.6, no baseline // patient states he has gotten BRBPR on and off for years however he has been having hard black stools for a week and a half // never had EGD and colonoscopy, had positive JAGUAR in the ED // no hematemesis  - BUN 50, patient states he has not been eating very much and not on diuretics, most likely secondary to UGIB  - started on protonix infusion, will keep NPO after midnight for possible EGD tomorrow  - will transfuse 1 unit prbc as requested by GI, anemia appears symptomatic  - starting LR at 60, lactate 2.8 // HD stable  - anemia microcytic, likely component of chronic GI bleed    # Hx of bladder and prostate cancer s/p cystectomy and prostatectomy  - no suprapubic pain, ostomy bag draining yellow urine, f/u PSA    # Thrombocytopenia  - splenomegaly seen on CT, thrombocytopenia likely component of consumption from blood loss and possibly sequestration  - will treat bleeding for now and monitor    # Systolic murmur  - 3/6 murmur heard on exam, patient does not follow with any doctors  - will order 2d echo to help guide fluid resuscitation, should not delay EGD  - f/u EKG    # DVT PPX: SCDs  # GI PPX: protonix drip    FULL CODE

## 2021-04-17 NOTE — DIETITIAN INITIAL EVALUATION ADULT. - OTHER CALCULATIONS
calorie 1583-1845kcal (MSJ x 1.2-1.4 AF) for low BMI  protein 63-74g (1.1-1.3g/kg CBW) fluid 1ml/kcal or per LIP

## 2021-04-17 NOTE — DIETITIAN INITIAL EVALUATION ADULT. - CONTINUE CURRENT NUTRITION CARE PLAN
Nutrition intervention: meals and snacks. When medically feasible advance diet per GI recs. If pt. to continue on clear liquids add Ensure clear BID, prosource gelatein plus BID/yes

## 2021-04-17 NOTE — CHART NOTE - NSCHARTNOTEFT_GEN_A_CORE
Called by Rn to report patient with elevated Blood pressure   - Pt seen and examined, denies all complaints   - Pt admitted with melena on IVF , Clear liquid diet   - Hydralazine 5mg ivp/ Hydralazine 50mg po TID, lisinopril 10mg po qd - Meds ordered per Dr. BOGGS   - Will cont to monitor

## 2021-04-17 NOTE — DIETITIAN INITIAL EVALUATION ADULT. - OTHER INFO
P/w: weakness, black stool. Generalized weakness and black stools likely secondary to symptomatic anemia from UGIB. Plans for EGD. Hx of bladder and prostate cancer s/p cystectomy and prostatectomy:  ostomy bag draining yellow urine.

## 2021-04-17 NOTE — DIETITIAN INITIAL EVALUATION ADULT. - ORAL INTAKE PTA/DIET HISTORY
Pt. reports good appetite PTP, regular diet. NKFA. No supplement use. Pt. endorses weight loss that occurred after CA treatment ~4yrs ago. Currently maintaining weight ~125lbs per pt. No cultural/[employer] dietary restr.

## 2021-04-18 LAB
ANION GAP SERPL CALC-SCNC: 13 MMOL/L — SIGNIFICANT CHANGE UP (ref 7–14)
BLD GP AB SCN SERPL QL: SIGNIFICANT CHANGE UP
BUN SERPL-MCNC: 17 MG/DL — SIGNIFICANT CHANGE UP (ref 10–20)
CALCIUM SERPL-MCNC: 8.2 MG/DL — LOW (ref 8.5–10.1)
CHLORIDE SERPL-SCNC: 106 MMOL/L — SIGNIFICANT CHANGE UP (ref 98–110)
CO2 SERPL-SCNC: 21 MMOL/L — SIGNIFICANT CHANGE UP (ref 17–32)
CREAT SERPL-MCNC: 1.2 MG/DL — SIGNIFICANT CHANGE UP (ref 0.7–1.5)
GLUCOSE SERPL-MCNC: 81 MG/DL — SIGNIFICANT CHANGE UP (ref 70–99)
HCT VFR BLD CALC: 32.8 % — LOW (ref 42–52)
HGB BLD-MCNC: 10.8 G/DL — LOW (ref 14–18)
INR BLD: 1.04 RATIO — SIGNIFICANT CHANGE UP (ref 0.65–1.3)
MCHC RBC-ENTMCNC: 26.1 PG — LOW (ref 27–31)
MCHC RBC-ENTMCNC: 32.9 G/DL — SIGNIFICANT CHANGE UP (ref 32–37)
MCV RBC AUTO: 79.2 FL — LOW (ref 80–94)
NRBC # BLD: 0 /100 WBCS — SIGNIFICANT CHANGE UP (ref 0–0)
PLATELET # BLD AUTO: 88 K/UL — LOW (ref 130–400)
POTASSIUM SERPL-MCNC: 4 MMOL/L — SIGNIFICANT CHANGE UP (ref 3.5–5)
POTASSIUM SERPL-SCNC: 4 MMOL/L — SIGNIFICANT CHANGE UP (ref 3.5–5)
PROTHROM AB SERPL-ACNC: 12 SEC — SIGNIFICANT CHANGE UP (ref 9.95–12.87)
RBC # BLD: 4.14 M/UL — LOW (ref 4.7–6.1)
RBC # FLD: 17.4 % — HIGH (ref 11.5–14.5)
SARS-COV-2 RNA SPEC QL NAA+PROBE: SIGNIFICANT CHANGE UP
SODIUM SERPL-SCNC: 140 MMOL/L — SIGNIFICANT CHANGE UP (ref 135–146)
WBC # BLD: 3.5 K/UL — LOW (ref 4.8–10.8)
WBC # FLD AUTO: 3.5 K/UL — LOW (ref 4.8–10.8)

## 2021-04-18 PROCEDURE — 99233 SBSQ HOSP IP/OBS HIGH 50: CPT

## 2021-04-18 RX ORDER — SOD SULF/SODIUM/NAHCO3/KCL/PEG
4000 SOLUTION, RECONSTITUTED, ORAL ORAL ONCE
Refills: 0 | Status: COMPLETED | OUTPATIENT
Start: 2021-04-18 | End: 2021-04-18

## 2021-04-18 RX ORDER — LANOLIN ALCOHOL/MO/W.PET/CERES
3 CREAM (GRAM) TOPICAL AT BEDTIME
Refills: 0 | Status: DISCONTINUED | OUTPATIENT
Start: 2021-04-18 | End: 2021-04-20

## 2021-04-18 RX ADMIN — Medication 50 MILLIGRAM(S): at 16:04

## 2021-04-18 RX ADMIN — Medication 5 MILLIGRAM(S): at 17:52

## 2021-04-18 RX ADMIN — SENNA PLUS 2 TABLET(S): 8.6 TABLET ORAL at 22:06

## 2021-04-18 RX ADMIN — LISINOPRIL 10 MILLIGRAM(S): 2.5 TABLET ORAL at 06:20

## 2021-04-18 RX ADMIN — PANTOPRAZOLE SODIUM 40 MILLIGRAM(S): 20 TABLET, DELAYED RELEASE ORAL at 17:53

## 2021-04-18 RX ADMIN — Medication 50 MILLIGRAM(S): at 06:20

## 2021-04-18 RX ADMIN — Medication 50 MILLIGRAM(S): at 22:05

## 2021-04-18 RX ADMIN — Medication 20 MILLIGRAM(S): at 22:06

## 2021-04-18 RX ADMIN — Medication 4000 MILLILITER(S): at 17:54

## 2021-04-18 RX ADMIN — POLYETHYLENE GLYCOL 3350 17 GRAM(S): 17 POWDER, FOR SOLUTION ORAL at 17:53

## 2021-04-18 NOTE — PROGRESS NOTE ADULT - ASSESSMENT
· Assessment	  75 year old male with history of bladder and prostate cancer s/p cystectomy and prostatectomy with removal of some lymph nodes presents with generalized weakness and black stools    #HTN, poorly controlled  - unknown home meds?  - start around clock Lisinopril hydralazine with prn coverage, monitoring Bp    # Generalized weakness and black stools likely secondary to symptomatic anemia from UGIB  - hgb stable, no baseline // patient states he has gotten BRBPR on and off for years however he has been having hard black stools for a week and a half // never had EGD and colonoscopy, had positive JAGUAR in the ED // no hematemesis  - BUN 50, patient states he has not been eating very much and not on diuretics, most likely secondary to UGIB  - started on protonix infusion, will keep NPO after midnight for possible EGD tomorrow  - will transfuse 1 unit prbc as requested by GI, anemia appears symptomatic  - starting LR at 60, lactate 2.8 // HD stable  - anemia microcytic, likely component of chronic GI bleed  - scope Monday 4-19    # Hx of bladder and prostate cancer s/p cystectomy and prostatectomy  - no suprapubic pain, ostomy bag draining yellow urine, f/u PSA    # Thrombocytopenia  - splenomegaly seen on CT, thrombocytopenia likely component of consumption from blood loss and possibly sequestration  - will treat bleeding for now and monitor    # Systolic murmur  - 3/6 murmur heard on exam, patient does not follow with any doctors  - will order 2d echo to help guide fluid resuscitation, should not delay EGD  - f/u EKG    # DVT PPX: SCDs  # GI PPX: protonix drip    FULL CODE

## 2021-04-19 ENCOUNTER — TRANSCRIPTION ENCOUNTER (OUTPATIENT)
Age: 76
End: 2021-04-19

## 2021-04-19 ENCOUNTER — RESULT REVIEW (OUTPATIENT)
Age: 76
End: 2021-04-19

## 2021-04-19 LAB
ANION GAP SERPL CALC-SCNC: 23 MMOL/L — HIGH (ref 7–14)
BUN SERPL-MCNC: 19 MG/DL — SIGNIFICANT CHANGE UP (ref 10–20)
CALCIUM SERPL-MCNC: 9.2 MG/DL — SIGNIFICANT CHANGE UP (ref 8.5–10.1)
CHLORIDE SERPL-SCNC: 104 MMOL/L — SIGNIFICANT CHANGE UP (ref 98–110)
CO2 SERPL-SCNC: 18 MMOL/L — SIGNIFICANT CHANGE UP (ref 17–32)
CREAT SERPL-MCNC: 1.3 MG/DL — SIGNIFICANT CHANGE UP (ref 0.7–1.5)
GLUCOSE SERPL-MCNC: 104 MG/DL — HIGH (ref 70–99)
HCT VFR BLD CALC: 36.8 % — LOW (ref 42–52)
HGB BLD-MCNC: 12.2 G/DL — LOW (ref 14–18)
INR BLD: 1.1 RATIO — SIGNIFICANT CHANGE UP (ref 0.65–1.3)
MCHC RBC-ENTMCNC: 26 PG — LOW (ref 27–31)
MCHC RBC-ENTMCNC: 33.2 G/DL — SIGNIFICANT CHANGE UP (ref 32–37)
MCV RBC AUTO: 78.5 FL — LOW (ref 80–94)
NRBC # BLD: 0 /100 WBCS — SIGNIFICANT CHANGE UP (ref 0–0)
PLATELET # BLD AUTO: 117 K/UL — LOW (ref 130–400)
POTASSIUM SERPL-MCNC: 3.8 MMOL/L — SIGNIFICANT CHANGE UP (ref 3.5–5)
POTASSIUM SERPL-SCNC: 3.8 MMOL/L — SIGNIFICANT CHANGE UP (ref 3.5–5)
PROTHROM AB SERPL-ACNC: 12.7 SEC — SIGNIFICANT CHANGE UP (ref 9.95–12.87)
RBC # BLD: 4.69 M/UL — LOW (ref 4.7–6.1)
RBC # FLD: 19.2 % — HIGH (ref 11.5–14.5)
SODIUM SERPL-SCNC: 145 MMOL/L — SIGNIFICANT CHANGE UP (ref 135–146)
WBC # BLD: 5.3 K/UL — SIGNIFICANT CHANGE UP (ref 4.8–10.8)
WBC # FLD AUTO: 5.3 K/UL — SIGNIFICANT CHANGE UP (ref 4.8–10.8)

## 2021-04-19 PROCEDURE — 43239 EGD BIOPSY SINGLE/MULTIPLE: CPT

## 2021-04-19 PROCEDURE — 99233 SBSQ HOSP IP/OBS HIGH 50: CPT

## 2021-04-19 PROCEDURE — 45385 COLONOSCOPY W/LESION REMOVAL: CPT | Mod: 59

## 2021-04-19 PROCEDURE — 88305 TISSUE EXAM BY PATHOLOGIST: CPT | Mod: 26

## 2021-04-19 PROCEDURE — 45388 COLONOSCOPY W/ABLATION: CPT

## 2021-04-19 PROCEDURE — 88312 SPECIAL STAINS GROUP 1: CPT | Mod: 26

## 2021-04-19 RX ADMIN — Medication 50 MILLIGRAM(S): at 21:33

## 2021-04-19 RX ADMIN — Medication 50 MILLIGRAM(S): at 05:40

## 2021-04-19 RX ADMIN — Medication 50 MILLIGRAM(S): at 18:06

## 2021-04-19 RX ADMIN — PANTOPRAZOLE SODIUM 40 MILLIGRAM(S): 20 TABLET, DELAYED RELEASE ORAL at 05:42

## 2021-04-19 RX ADMIN — Medication 3 MILLIGRAM(S): at 21:33

## 2021-04-19 RX ADMIN — LISINOPRIL 10 MILLIGRAM(S): 2.5 TABLET ORAL at 05:40

## 2021-04-19 RX ADMIN — PANTOPRAZOLE SODIUM 40 MILLIGRAM(S): 20 TABLET, DELAYED RELEASE ORAL at 19:26

## 2021-04-19 RX ADMIN — POLYETHYLENE GLYCOL 3350 17 GRAM(S): 17 POWDER, FOR SOLUTION ORAL at 05:43

## 2021-04-19 RX ADMIN — Medication 5 MILLIGRAM(S): at 05:42

## 2021-04-19 NOTE — PROGRESS NOTE ADULT - ASSESSMENT
· Assessment	  75 year old male with history of bladder and prostate cancer s/p cystectomy and prostatectomy with removal of some lymph nodes presents with generalized weakness and black stools    # Generalized weakness and black stools likely secondary to symptomatic anemia from UGIB  - hgb 7.6, no baseline // patient states he has gotten BRBPR on and off for years however he has been having hard black stools for a week and a half // never had EGD and colonoscopy, had positive JAGUAR in the ED // no hematemesis  - BUN 50, patient states he has not been eating very much and not on diuretics, most likely secondary to UGIB  - started on protonix infusion, will keep NPO after midnight for possible EGD tomorrow  - will transfuse 1 unit prbc as requested by GI, anemia appears symptomatic  - starting LR at 60, lactate 2.8 // HD stable  - anemia microcytic, likely component of chronic GI bleed, f/u GI    # Hx of bladder and prostate cancer s/p cystectomy and prostatectomy  - no suprapubic pain, ostomy bag draining yellow urine, f/u PSA    # Thrombocytopenia  - splenomegaly seen on CT, thrombocytopenia likely component of consumption from blood loss and possibly sequestration  - will treat bleeding for now and monitor    # Systolic murmur  - 3/6 murmur heard on exam, patient does not follow with any doctors  - will order 2d echo to help guide fluid resuscitation, should not delay EGD  - f/u EKG    # DVT PPX: SCDs  # GI PPX: protonix drip    FULL CODE       · Assessment	  75 year old male with history of bladder and prostate cancer s/p cystectomy and prostatectomy with removal of some lymph nodes presents with generalized weakness and black stools    # Generalized weakness and black stools likely secondary to symptomatic anemia from UGIB  - hgb 7.6, no baseline // patient states he has gotten BRBPR on and off for years however he has been having hard black stools for a week and a half // never had EGD and colonoscopy, had positive JAGUAR in the ED // no hematemesis  - BUN 50, patient states he has not been eating very much and not on diuretics, most likely secondary to UGIB  - started on protonix infusion,  - will transfuse 1 unit prbc as requested by GI, anemia appears symptomatic  - starting LR at 60, lactate 2.8 // HD stable  - anemia microcytic, likely component of chronic GI bleed, f/u GI    # Hx of bladder and prostate cancer s/p cystectomy and prostatectomy  - no suprapubic pain, ostomy bag draining yellow urine, f/u PSA    # Thrombocytopenia  - splenomegaly seen on CT, thrombocytopenia likely component of consumption from blood loss and possibly sequestration  - will treat bleeding for now and monitor    # Systolic murmur  - 3/6 murmur heard on exam, patient does not follow with any doctors  - will order 2d echo to help guide fluid resuscitation, should not delay EGD  - f/u EKG    # DVT PPX: SCDs  # GI PPX: protonix drip    FULL CODE

## 2021-04-19 NOTE — PROGRESS NOTE ADULT - REASON FOR ADMISSION
generalized weakness and black stool
generalized weakness and black stool
black stool
generalized weakness and black stool

## 2021-04-19 NOTE — PROGRESS NOTE ADULT - SUBJECTIVE AND OBJECTIVE BOX
NAEO  +  after PRBC transfusions, Hgb:  10+ <=  8+  <=  7  <= 6.5,    target at 8 per GI   Endo Scope next week Mon per GI          Physical Exam:   Physical Exam: General: WN/WD, appearing slightly pale  Neurology: A&Ox3, nonfocal, DE DIOS x 4  Head:  Normocephalic, atraumatic  ENT:  Mucosa moist, no ulcerations  Neck:  Supple, no sinuses or palpable masses  Lymphatic:  No palpable cervical, supraclavicular adenopathy  CV: RRR, S1S2, 3/6 systolic murmur  Abdominal: Soft, NT, ND no palpable mass, has ostomy bag in suprapubic region, JAGUAR was positive  MSK: No edema    
  BEN CACERES  75y, Male  Allergy: No Known Allergies      OVERNIGHT EVENTS:  no acute events overnight, no active bleeding, stable H/H  scheduled Endoscopy today    Physical Exam:   Physical Exam: General: WN/WD, appearing slightly pale  Neurology: A&Ox3, nonfocal, DE DIOS x 4  Head:  Normocephalic, atraumatic  ENT:  Mucosa moist, no ulcerations  Neck:  Supple, no sinuses or palpable masses  Lymphatic:  No palpable cervical, supraclavicular adenopathy  CV: RRR, S1S2, 3/6 systolic murmur  Abdominal: Soft, NT, ND no palpable mass, has ostomy bag in suprapubic region, JAGUAR was positive  MSK: No edema              PAST MEDICAL & SURGICAL HISTORY:  Hypertension    Bladder cancer    H/O prostatectomy    H/O total cystectomy        VITALS:  T(F): 97.7 (04-19-21 @ 17:05), Max: 98.2 (04-18-21 @ 20:11)  HR: 70 (04-19-21 @ 17:20)  BP: 173/80 (04-19-21 @ 17:20) (123/92 - 181/75)  RR: 18 (04-19-21 @ 17:20)  SpO2: 99% (04-18-21 @ 20:11)    TESTS & MEASUREMENTS:  Height (cm): 177.8 (04-19-21 @ 15:27)  Weight (kg): 56.7 (04-19-21 @ 15:27)  BMI (kg/m2): 17.9 (04-19-21 @ 15:27)    04-17-21 @ 07:01  -  04-18-21 @ 07:00  --------------------------------------------------------  IN: 660 mL / OUT: 0 mL / NET: 660 mL    04-18-21 @ 07:01  -  04-19-21 @ 07:00  --------------------------------------------------------  IN: 300 mL / OUT: 200 mL / NET: 100 mL                            12.2   5.30  )-----------( 117      ( 19 Apr 2021 08:33 )             36.8     PT/INR - ( 19 Apr 2021 08:33 )   PT: 12.70 sec;   INR: 1.10 ratio           04-19    145  |  104  |  19  ----------------------------<  104<H>  3.8   |  18  |  1.3    Ca    9.2      19 Apr 2021 08:33                  RADIOLOGY & ADDITIONAL TESTS:    MEDICATIONS:  MEDICATIONS  (STANDING):  bisacodyl 5 milliGRAM(s) Oral every 12 hours  hydrALAZINE 50 milliGRAM(s) Oral three times a day  lisinopril 10 milliGRAM(s) Oral daily  melatonin 3 milliGRAM(s) Oral at bedtime  pantoprazole  Injectable 40 milliGRAM(s) IV Push every 12 hours  polyethylene glycol 3350 17 Gram(s) Oral two times a day  senna 2 Tablet(s) Oral at bedtime    MEDICATIONS  (PRN):  melatonin 5 milliGRAM(s) Oral at bedtime PRN Insomnia      HEALTH ISSUES - PROBLEM Dx:          Case discussed in details with:     PRESSURE ULCERS                                                 POA        YES/NO  URETHRAL CATHETER                                           POA        YES/NO  CENTRAL VENOUS CATHETER/PICC LINE          POA        YES/NO  SEPSIS                                                                       POA        YES/NO    
NAEO  Hgb 7 <= 6.5,  target at 8 per GI   Scope next week        Physical Exam:   Physical Exam: General: WN/WD, appearing slightly pale  Neurology: A&Ox3, nonfocal, DE DIOS x 4  Head:  Normocephalic, atraumatic  ENT:  Mucosa moist, no ulcerations  Neck:  Supple, no sinuses or palpable masses  Lymphatic:  No palpable cervical, supraclavicular adenopathy  CV: RRR, S1S2, 3/6 systolic murmur  Abdominal: Soft, NT, ND no palpable mass, has ostomy bag in suprapubic region, JAGUAR was positive  MSK: No edema      
NAEO  after PRBC transfusions, Hgb:  8+  <=  7  <= 6.5,    target at 8 per GI   Endo Scope next week Mon? Tues?        Physical Exam:   Physical Exam: General: WN/WD, appearing slightly pale  Neurology: A&Ox3, nonfocal, DE DIOS x 4  Head:  Normocephalic, atraumatic  ENT:  Mucosa moist, no ulcerations  Neck:  Supple, no sinuses or palpable masses  Lymphatic:  No palpable cervical, supraclavicular adenopathy  CV: RRR, S1S2, 3/6 systolic murmur  Abdominal: Soft, NT, ND no palpable mass, has ostomy bag in suprapubic region, JAGUAR was positive  MSK: No edema

## 2021-04-19 NOTE — CHART NOTE - NSCHARTNOTEFT_GEN_A_CORE
PACU ANESTHESIA ADMISSION NOTE      Procedure:   Post op diagnosis:      ____  Intubated  TV:______       Rate: ______      FiO2: ______    __x__  Patent Airway    __x__  Full return of protective reflexes    __x__  Full recovery from anesthesia / back to baseline     Vitals:   T:  97.7         R:  14                BP:  138/61                Sat:100                   P: 71      Mental Status:  ____ Awake   _____ Alert   _x____ Drowsy   _____ Sedated    Nausea/Vomiting:  _x___ NO  ______Yes,   __x__ See Post - Op Orders          Pain Scale (0-10):  __0___    Treatment: ____ None    __x__ See Post - Op/PCA Orders    Post - Operative Fluids:   ____ Oral   __x__ See Post - Op Orders    Plan: Discharge:   ____Home       __x___Floor     _____Critical Care    _____  Other:_________________    Comments: When parameters met.

## 2021-04-19 NOTE — DISCHARGE NOTE NURSING/CASE MANAGEMENT/SOCIAL WORK - PATIENT PORTAL LINK FT
You can access the FollowMyHealth Patient Portal offered by [facility] by registering at the following website: [email or web address]. By joining [facility]’s FollowMyHealth portal, you will also be able to view your health information using other applications (apps) compatible with our system.

## 2021-04-20 ENCOUNTER — TRANSCRIPTION ENCOUNTER (OUTPATIENT)
Age: 76
End: 2021-04-20

## 2021-04-20 VITALS
HEART RATE: 83 BPM | SYSTOLIC BLOOD PRESSURE: 138 MMHG | TEMPERATURE: 98 F | RESPIRATION RATE: 18 BRPM | DIASTOLIC BLOOD PRESSURE: 61 MMHG

## 2021-04-20 PROCEDURE — 99239 HOSP IP/OBS DSCHRG MGMT >30: CPT

## 2021-04-20 RX ORDER — PANTOPRAZOLE SODIUM 20 MG/1
40 TABLET, DELAYED RELEASE ORAL
Qty: 0 | Refills: 0 | DISCHARGE
Start: 2021-04-20

## 2021-04-20 RX ORDER — SENNA PLUS 8.6 MG/1
2 TABLET ORAL
Qty: 0 | Refills: 0 | DISCHARGE
Start: 2021-04-20

## 2021-04-20 RX ORDER — HYDRALAZINE HCL 50 MG
1 TABLET ORAL
Qty: 0 | Refills: 0 | DISCHARGE
Start: 2021-04-20

## 2021-04-20 RX ORDER — LANOLIN ALCOHOL/MO/W.PET/CERES
1 CREAM (GRAM) TOPICAL
Qty: 0 | Refills: 0 | DISCHARGE
Start: 2021-04-20

## 2021-04-20 RX ORDER — LISINOPRIL 2.5 MG/1
1 TABLET ORAL
Qty: 0 | Refills: 0 | DISCHARGE
Start: 2021-04-20

## 2021-04-20 RX ADMIN — PANTOPRAZOLE SODIUM 40 MILLIGRAM(S): 20 TABLET, DELAYED RELEASE ORAL at 06:35

## 2021-04-20 RX ADMIN — Medication 50 MILLIGRAM(S): at 06:36

## 2021-04-20 RX ADMIN — LISINOPRIL 10 MILLIGRAM(S): 2.5 TABLET ORAL at 06:35

## 2021-04-20 NOTE — DISCHARGE NOTE PROVIDER - CARE PROVIDERS DIRECT ADDRESSES
,DirectAddress_Unknown ,[email or web address]

## 2021-04-20 NOTE — DISCHARGE NOTE PROVIDER - PROVIDER TOKENS
FREE:[LAST:[PCP],PHONE:[(   )    -],FAX:[(   )    -],FOLLOWUP:[1 week]] PROVIDER:[TOKEN:[[id number]],FOLLOWUP:[2 weeks]]

## 2021-04-20 NOTE — DISCHARGE NOTE PROVIDER - NSDCMRMEDTOKEN_GEN_ALL_CORE_FT
hydrALAZINE 50 mg oral tablet: 1 tab(s) orally 3 times a day  lisinopril 10 mg oral tablet: 1 tab(s) orally once a day  melatonin 3 mg oral tablet: 1 tab(s) orally once a day (at bedtime)  pantoprazole 40 mg intravenous injection: 40 milligram(s) intravenous every 12 hours  senna oral tablet: 2 tab(s) orally once a day (at bedtime)

## 2021-04-20 NOTE — DISCHARGE NOTE PROVIDER - CARE PROVIDER_API CALL
PCP,   Phone: (   )    -  Fax: (   )    -  Follow Up Time: 1 week   Marcin Chan)  Gastroenterology; Internal Medicine  [address] [place] [address]  Phone: [phone]  Fax: [phone]  Follow Up Time: 2 weeks

## 2021-04-20 NOTE — DISCHARGE NOTE PROVIDER - NSDCCPCAREPLAN_GEN_ALL_CORE_FT
PRINCIPAL DISCHARGE DIAGNOSIS  Diagnosis: Melena  Assessment and Plan of Treatment: Follow up with Gastroenterology doctor after discharge.   Return to the emergency department if you continue to see blood in your bowel movements after treatment or you have severe pain in your abdomen.  Contact your healthcare provider if you have new or worsening symptoms. or you have questions or concerns about your condition or care.

## 2021-04-20 NOTE — DISCHARGE NOTE PROVIDER - HOSPITAL COURSE
75 year old male with history of bladder and prostate cancer s/p cystectomy and prostatectomy with removal of some lymph nodes presents with generalized weakness and black stools. Patient stated he has had bright red blood in his stools for years stating that he thinks he had hemorrhoids- did not  follow up with any doctors as an outpatient.  In the ED he received a dose of morphine, zofran, 1L NS bolus, and started on protonix infusion. GI on board, recommended PPI BID, Aggressive bowel regimen. Active T&S an close Hgb monitoring.  Pt s/p 4 units PRBC.     Patient s/p EGD/colonoscopy???? GI clearance for discharge and outpatient f/u    75 year old male with history of bladder and prostate cancer s/p cystectomy and prostatectomy with removal of some lymph nodes presents with generalized weakness and black stools. Patient stated he has had bright red blood in his stools for years stating that he thinks he had hemorrhoids- did not  follow up with any doctors as an outpatient.  In the ED he received a dose of morphine, zofran, 1L NS bolus, and started on protonix infusion. GI on board, recommended PPI BID, Aggressive bowel regimen. Active T&S an close Hgb monitoring.  Pt received 4 units PRBC. Patient is s/p EGD/colonoscopy.     EGD Impressions:    Normal mucosa in the whole esophagus.    Erythema in the stomach compatible with non-erosive gastritis. (Biopsy).    Esophageal nodule. (Biopsy).    Normal mucosa in the whole examined duodenum. (Biopsy).     Colonoscopy Impressions:    Polyp (1 cm) in the transverse colon. (Polypectomy, Endoclip).    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 (Thermal Therapy).    Internal and external hemorrhoids.       GI cleared for discharge and outpatient f/u in 2-4 weeks.

## 2021-04-21 LAB
HCV RNA FLD QL NAA+PROBE: SIGNIFICANT CHANGE UP
SURGICAL PATHOLOGY STUDY: SIGNIFICANT CHANGE UP

## 2021-04-22 PROBLEM — C67.9 MALIGNANT NEOPLASM OF BLADDER, UNSPECIFIED: Chronic | Status: ACTIVE | Noted: 2021-04-15

## 2021-04-22 PROBLEM — I10 ESSENTIAL (PRIMARY) HYPERTENSION: Chronic | Status: ACTIVE | Noted: 2021-04-15

## 2021-04-22 LAB — SURGICAL PATHOLOGY STUDY: SIGNIFICANT CHANGE UP

## 2021-04-23 DIAGNOSIS — R01.1 CARDIAC MURMUR, UNSPECIFIED: ICD-10-CM

## 2021-04-23 DIAGNOSIS — K64.4 RESIDUAL HEMORRHOIDAL SKIN TAGS: ICD-10-CM

## 2021-04-23 DIAGNOSIS — Z80.52 FAMILY HISTORY OF MALIGNANT NEOPLASM OF BLADDER: ICD-10-CM

## 2021-04-23 DIAGNOSIS — I10 ESSENTIAL (PRIMARY) HYPERTENSION: ICD-10-CM

## 2021-04-23 DIAGNOSIS — Z90.89 ACQUIRED ABSENCE OF OTHER ORGANS: ICD-10-CM

## 2021-04-23 DIAGNOSIS — K29.70 GASTRITIS, UNSPECIFIED, WITHOUT BLEEDING: ICD-10-CM

## 2021-04-23 DIAGNOSIS — Z93.6 OTHER ARTIFICIAL OPENINGS OF URINARY TRACT STATUS: ICD-10-CM

## 2021-04-23 DIAGNOSIS — R16.1 SPLENOMEGALY, NOT ELSEWHERE CLASSIFIED: ICD-10-CM

## 2021-04-23 DIAGNOSIS — F17.210 NICOTINE DEPENDENCE, CIGARETTES, UNCOMPLICATED: ICD-10-CM

## 2021-04-23 DIAGNOSIS — K64.8 OTHER HEMORRHOIDS: ICD-10-CM

## 2021-04-23 DIAGNOSIS — K92.1 MELENA: ICD-10-CM

## 2021-04-23 DIAGNOSIS — K63.5 POLYP OF COLON: ICD-10-CM

## 2021-04-23 DIAGNOSIS — D69.6 THROMBOCYTOPENIA, UNSPECIFIED: ICD-10-CM

## 2021-04-23 DIAGNOSIS — K55.21 ANGIODYSPLASIA OF COLON WITH HEMORRHAGE: ICD-10-CM

## 2021-04-23 DIAGNOSIS — Z90.6 ACQUIRED ABSENCE OF OTHER PARTS OF URINARY TRACT: ICD-10-CM

## 2021-05-10 ENCOUNTER — APPOINTMENT (OUTPATIENT)
Dept: HEPATOLOGY | Facility: CLINIC | Age: 76
End: 2021-05-10
Payer: MEDICARE

## 2021-05-10 ENCOUNTER — OUTPATIENT (OUTPATIENT)
Dept: OUTPATIENT SERVICES | Facility: HOSPITAL | Age: 76
LOS: 1 days | Discharge: HOME | End: 2021-05-10

## 2021-05-10 VITALS
WEIGHT: 127 LBS | HEART RATE: 70 BPM | HEIGHT: 70.5 IN | DIASTOLIC BLOOD PRESSURE: 72 MMHG | BODY MASS INDEX: 17.98 KG/M2 | OXYGEN SATURATION: 99 % | SYSTOLIC BLOOD PRESSURE: 140 MMHG | TEMPERATURE: 97.2 F

## 2021-05-10 DIAGNOSIS — Z90.6 ACQUIRED ABSENCE OF OTHER PARTS OF URINARY TRACT: Chronic | ICD-10-CM

## 2021-05-10 DIAGNOSIS — Z90.79 ACQUIRED ABSENCE OF OTHER GENITAL ORGAN(S): Chronic | ICD-10-CM

## 2021-05-10 PROCEDURE — 99204 OFFICE O/P NEW MOD 45 MIN: CPT

## 2021-05-10 PROCEDURE — 99214 OFFICE O/P EST MOD 30 MIN: CPT

## 2021-05-10 NOTE — ASSESSMENT
[FreeTextEntry1] : 75 year old male with history of bladder and prostate cancer s/p cystectomy and prostatectomy with removal of some lymph nodes\par \par #Hepatitis C\par - 4/16/21 positive in hospital\par - RNA +\par - CT AP did not show any cirrhosis. Hepatic granuloma\par - f/u viral load, fibrosure, HIV, Hep B, genotype\par - Treatment plan after labs\par \par #Melena - resolved\par - EGD Impressions: Normal mucosa in the whole esophagus. Erythema in the stomach compatible with non-erosive gastritis. (Biopsy). Esophageal nodule. (Biopsy). Normal mucosa in the whole examined duodenum. (Biopsy).\par - Colonoscopy Impressions: Sessile Polyp (1 cm) in the transverse colon. (Polypectomy, Endoclip). Angiectasia in the cecum. (Thermal Therapy).  Internal and external hemorrhoids. Repeat after 1 year\par \par Follow Up in 4-6 weeks

## 2021-05-10 NOTE — HISTORY OF PRESENT ILLNESS
[de-identified] : 75 year old male with history of bladder and prostate cancer s/p cystectomy and prostatectomy with removal of some lymph nodes, recent hospitalization 2 weeks ago for melena presents with new diagnosis of hepatitis C in the hospital. \par

## 2021-06-05 NOTE — CDI QUERY NOTE - NSCDIOTHERTXTBX2_GEN_ALL_CORE_FT
Documentation:  ** 4/17 dietitian evaluation:      - Physical Assessment: underweight; BMI 17.9.     - Nutrition Diagnostic Terminology: Other Inadequate protein energy intake    - · Continue Current Nutrition Care Plan: yes  Nutrition intervention: meals and snacks. When medically feasible advance diet per GI recs. If pt. to continue on clear liquids add Ensure clear BID, prosource gelatein plus BID.                                                      Query:  Based on your clinical judgment and consideration of these clinical indicators, please clarify if you agree with dietitian evaluation of the patient being underweight with BMI 17.9?  • Yes. I concur with dietitian evaluation of the patient. Patient is underweight with BMI 17.9  • No disagree with Dietitian evaluation.	  • Other (please specify).  • Unable to determine.

## 2021-06-05 NOTE — CDI QUERY NOTE - NSCDIOTHERTXTBX_GEN_ALL_CORE_HH
Documentation:  4/25 H&P: Lactate elevation. IVFs (LR). Repeat lactate level in the AM.                                                        Lab:   Lactate, Blood: 2.8 (4/15 @ 13:25)  Lactate, Blood: 0.8 (5/16 @ 6:12)    Orders:  4/15: Sodium chloride 09% 1000 mL IV bolus  4/15 - 4/18: Lactated ringers infusion at rate 60 mL/Hr    Query:  Based on your clinical evaluation of the patient and consideration of the above clinical indicators,  please clarify the significance of the above lactate lab values:  • lactic acidosis  • Insignificant lactate lab values  • Other (please specify)  • Unable  to determine

## 2021-06-07 ENCOUNTER — APPOINTMENT (OUTPATIENT)
Dept: HEPATOLOGY | Facility: CLINIC | Age: 76
End: 2021-06-07

## 2021-06-07 ENCOUNTER — OUTPATIENT (OUTPATIENT)
Dept: OUTPATIENT SERVICES | Facility: HOSPITAL | Age: 76
LOS: 1 days | Discharge: HOME | End: 2021-06-07

## 2021-06-07 VITALS
BODY MASS INDEX: 17.98 KG/M2 | HEIGHT: 70.5 IN | DIASTOLIC BLOOD PRESSURE: 80 MMHG | WEIGHT: 127 LBS | HEART RATE: 77 BPM | OXYGEN SATURATION: 99 % | SYSTOLIC BLOOD PRESSURE: 145 MMHG | TEMPERATURE: 96.5 F

## 2021-06-07 DIAGNOSIS — Z90.6 ACQUIRED ABSENCE OF OTHER PARTS OF URINARY TRACT: Chronic | ICD-10-CM

## 2021-06-07 DIAGNOSIS — Z02.9 ENCOUNTER FOR ADMINISTRATIVE EXAMINATIONS, UNSPECIFIED: ICD-10-CM

## 2021-06-07 DIAGNOSIS — Z90.79 ACQUIRED ABSENCE OF OTHER GENITAL ORGAN(S): Chronic | ICD-10-CM

## 2021-06-17 NOTE — CHART NOTE - NSCHARTNOTEFT_GEN_A_CORE
Lactate elevation, corrected on admission.  Likely 2/2 tissue hypoperfusion with UGIB.  No evidence of shock.     Patient is underweight with BMI 17.9  Incr oral intake as tolerated after GI endoscope per GI, and nutrition recommendations.

## 2021-07-26 ENCOUNTER — LABORATORY RESULT (OUTPATIENT)
Age: 76
End: 2021-07-26

## 2021-07-26 ENCOUNTER — NON-APPOINTMENT (OUTPATIENT)
Age: 76
End: 2021-07-26

## 2021-07-26 ENCOUNTER — APPOINTMENT (OUTPATIENT)
Dept: HEPATOLOGY | Facility: CLINIC | Age: 76
End: 2021-07-26
Payer: MEDICARE

## 2021-07-26 ENCOUNTER — OUTPATIENT (OUTPATIENT)
Dept: OUTPATIENT SERVICES | Facility: HOSPITAL | Age: 76
LOS: 1 days | Discharge: HOME | End: 2021-07-26

## 2021-07-26 VITALS
HEIGHT: 70.5 IN | OXYGEN SATURATION: 99 % | TEMPERATURE: 96.4 F | DIASTOLIC BLOOD PRESSURE: 70 MMHG | WEIGHT: 127 LBS | BODY MASS INDEX: 17.98 KG/M2 | HEART RATE: 75 BPM | SYSTOLIC BLOOD PRESSURE: 155 MMHG

## 2021-07-26 DIAGNOSIS — Z90.79 ACQUIRED ABSENCE OF OTHER GENITAL ORGAN(S): Chronic | ICD-10-CM

## 2021-07-26 DIAGNOSIS — Z90.6 ACQUIRED ABSENCE OF OTHER PARTS OF URINARY TRACT: Chronic | ICD-10-CM

## 2021-07-26 PROCEDURE — 99214 OFFICE O/P EST MOD 30 MIN: CPT | Mod: GC

## 2021-07-26 NOTE — HISTORY OF PRESENT ILLNESS
[de-identified] : 75 year old male with history of bladder and prostate cancer s/p cystectomy and prostatectomy with removal of some lymph nodes, recent hospitalization 2 weeks ago for melena presents with new diagnosis of hepatitis C in the hospital. \par  [FreeTextEntry1] : 76 year old male with history of bladder and prostate cancer s/p cystectomy and prostatectomy with removal of some lymph nodes presents for hep c followup\par \par viral load 6.92, 1154464\par fibrosure F4 A0\par HIV -ve\par Hep B -ve\par genotype alpha 2 macro 526

## 2021-07-26 NOTE — ASSESSMENT
[FreeTextEntry1] : 76 year old male with history of bladder and prostate cancer s/p cystectomy and prostatectomy with removal of some lymph nodes presents for hep c followup\par \par #Hepatitis C\par - 4/16/21 positive in hospital\par - RNA +\par - CT AP did not show any cirrhosis. Hepatic granuloma\par - viral load 6.92, 1435023\par - fibrosure F4 A0\par - HIV -ve\par - Hep B -ve\par - genotype A1\par - Start eplusa\par - Hepatitis a and b vaccine after hep b sAB and hep a IgG results\par \par #Melena - resolved\par - EGD Impressions: Normal mucosa in the whole esophagus. Erythema in the stomach compatible with non-erosive gastritis. (Biopsy). Esophageal nodule. (Biopsy). Normal mucosa in the whole examined duodenum. (Biopsy).\par - Colonoscopy Impressions: Sessile Polyp (1 cm) in the transverse colon. (Polypectomy, Endoclip). Angiectasia in the cecum. (Thermal Therapy).  Internal and external hemorrhoids. Repeat after 1 year\par \par Follow after 4 weeks after labs

## 2021-07-26 NOTE — PHYSICAL EXAM
[General Appearance - Alert] : alert [General Appearance - In No Acute Distress] : in no acute distress [Sclera] : the sclera and conjunctiva were normal [Outer Ear] : the ears and nose were normal in appearance [Neck Appearance] : the appearance of the neck was normal [] : no respiratory distress [Apical Impulse] : the apical impulse was normal [Abdomen Soft] : soft [No CVA Tenderness] : no ~M costovertebral angle tenderness [Abnormal Walk] : normal gait [Skin Turgor] : normal skin turgor [Deep Tendon Reflexes (DTR)] : deep tendon reflexes were 2+ and symmetric [No Focal Deficits] : no focal deficits [Oriented To Time, Place, And Person] : oriented to person, place, and time [Scleral Icterus] : No Scleral Icterus [Hepatojugular Reflux] : patient did not have a sustained hepatojugular reflux [Spider Angioma] : No spider angioma(s) were observed [Abdominal  Ascites] : no ascites [Asterixis] : no asterixis observed [Jaundice] : No jaundice [Depression] : no depression [Hallucinations] : ~T no ~M hallucinations [Delusions] : no ~T delusions

## 2021-08-05 NOTE — PROGRESS NOTE ADULT - NSICDXPILOT_GEN_ALL_CORE
[place]
25.7
O-L Flap Text: The defect edges were debeveled with a #15 scalpel blade.  Given the location of the defect, shape of the defect and the proximity to free margins an O-L flap was deemed most appropriate.  Using a sterile surgical marker, an appropriate advancement flap was drawn incorporating the defect and placing the expected incisions within the relaxed skin tension lines where possible.    The area thus outlined was incised deep to adipose tissue with a #15 scalpel blade.  The skin margins were undermined to an appropriate distance in all directions utilizing iris scissors.

## 2021-09-13 ENCOUNTER — APPOINTMENT (OUTPATIENT)
Dept: HEPATOLOGY | Facility: CLINIC | Age: 76
End: 2021-09-13

## 2021-12-02 ENCOUNTER — APPOINTMENT (OUTPATIENT)
Dept: GASTROENTEROLOGY | Facility: CLINIC | Age: 76
End: 2021-12-02
Payer: MEDICARE

## 2021-12-02 PROCEDURE — 99213 OFFICE O/P EST LOW 20 MIN: CPT

## 2021-12-03 NOTE — ASSESSMENT
[FreeTextEntry1] : 76 year old  male with history of bladder and prostate cancer s/p cystectomy and prostatectomy with removal of some lymph nodes presents for hep c followup post treatment\par \par #Hepatitis C\par - 4/16/21 positive in hospital\par - RNA + genotype 1A \par - CT AP did not show any cirrhosis. Hepatic granuloma\par - viral load 6.92, [id number]\par - fibrosure F4 A0 but no cirrhosis on imaging \par - HIV -ve\par - Hep B -ve\par - Started eplusa in Sept and completed 12 weeks Nov 28 th\par repeat viral load CBC CMP INR and check fibroscan\par \par \par Health maintenance \par - Hepatitis a - has immunity \par - Hep B  vaccine on follow up\par Colonoscopy done in Aug 2021\par \par F/u after 12 weeks\par \par Advised echo with primary care to evaluate for aortic stenosis

## 2021-12-03 NOTE — PHYSICAL EXAM
[General Appearance - Alert] : alert [General Appearance - Well Nourished] : well nourished [Sclera] : the sclera and conjunctiva were normal [Jugular Venous Distention Increased] : there was no jugular-venous distention [Respiration, Rhythm And Depth] : normal respiratory rhythm and effort [Auscultation Breath Sounds / Voice Sounds] : lungs were clear to auscultation bilaterally [Heart Rate And Rhythm] : heart rate was normal and rhythm regular [Heart Sounds] : normal S1 and S2 [FreeTextEntry1] : ESM AA

## 2021-12-03 NOTE — HISTORY OF PRESENT ILLNESS
[Yellow Skin Or Eyes (Jaundice)] : denies jaundice [Abdominal Pain] : denies abdominal pain [de-identified] : Hx of chronic hepatitis C started Epclusa treatment for 12 weeks in Sept and completed on Nov 28 th. \par No complaints other than jaundice.

## 2022-01-06 ENCOUNTER — APPOINTMENT (OUTPATIENT)
Dept: GASTROENTEROLOGY | Facility: CLINIC | Age: 77
End: 2022-01-06

## 2022-03-03 ENCOUNTER — APPOINTMENT (OUTPATIENT)
Dept: GASTROENTEROLOGY | Facility: CLINIC | Age: 77
End: 2022-03-03
Payer: MEDICARE

## 2022-03-03 VITALS
SYSTOLIC BLOOD PRESSURE: 211 MMHG | HEART RATE: 71 BPM | WEIGHT: 133 LBS | HEIGHT: 70 IN | BODY MASS INDEX: 19.04 KG/M2 | TEMPERATURE: 97.8 F | DIASTOLIC BLOOD PRESSURE: 102 MMHG

## 2022-03-03 DIAGNOSIS — Z23 ENCOUNTER FOR IMMUNIZATION: ICD-10-CM

## 2022-03-03 DIAGNOSIS — Z00.00 ENCOUNTER FOR GENERAL ADULT MEDICAL EXAMINATION W/OUT ABNORMAL FINDINGS: ICD-10-CM

## 2022-03-03 PROCEDURE — 91200 LIVER ELASTOGRAPHY: CPT

## 2022-03-03 PROCEDURE — 99214 OFFICE O/P EST MOD 30 MIN: CPT

## 2022-03-03 RX ORDER — HEPATITIS B VACCINE (RECOMBINANT) ADJUVANTED 20 UG/.5ML
20 INJECTION, SOLUTION INTRAMUSCULAR
Qty: 2 | Refills: 0 | Status: ACTIVE | COMMUNITY
Start: 2022-03-03 | End: 1900-01-01

## 2022-03-04 PROBLEM — Z00.00 ENCOUNTER FOR PREVENTIVE HEALTH EXAMINATION: Status: ACTIVE | Noted: 2021-04-22

## 2022-03-04 PROBLEM — Z23 ENCOUNTER FOR IMMUNIZATION: Status: ACTIVE | Noted: 2022-03-04

## 2022-03-04 RX ORDER — VELPATASVIR AND SOFOSBUVIR 100; 400 MG/1; MG/1
400-100 TABLET, FILM COATED ORAL DAILY
Qty: 90 | Refills: 0 | Status: COMPLETED | COMMUNITY
Start: 2021-07-26 | End: 2021-11-28

## 2022-03-04 NOTE — PHYSICAL EXAM
[Heart Sounds] : normal S1 and S2 [Heart Sounds Gallop] : no gallops [Abdomen Soft] : soft [Abdomen Tenderness] : non-tender [] : no hepato-splenomegaly [Scleral Icterus] : No Scleral Icterus [Spider Angioma] : No spider angioma(s) were observed [Abdominal Bruit] : no abdominal bruit [Asterixis] : no asterixis observed [FreeTextEntry1] : urostomy

## 2022-03-04 NOTE — ASSESSMENT
[FreeTextEntry1] : 76 year old  male with history of bladder and prostate cancer s/p cystectomy and prostatectomy hepatitis C treated Epclusa seen in follow up \par \par #Chronic hepatitis C with advanced fibrosis \par - 4/16/21 positive in hospital\par - Genotype 1A viral load 6.92, [id number]\par - CT AP did not show any cirrhosis. Hepatic granuloma\par - Pretreatment fibrosure F4 A0  fib 4 4.92 (F3-F4) but no cirrhosis on imaging \par   Post treatment Fibroscan F3 fibrosis \par - HIV -ve\par - Hep B -ve\par - Started Eplcusa in Sept and completed 12 weeks Nov 28 th 2021\par - HCV RNA negative at end of treatment\par - Repeat viral load to confirm SVR 12\par -  CBC CMP INR\par - Advised to avoid alcohol\par \par HCC screening\par Pretreatment F4 fibrosis on Fibrosure\par Post treatment F3 fibrosis on Fibroscan\par Will consider has cirrhosis and do HCC screening every 6  months \par \par \par Health maintenance \par - Hepatitis a - has immunity \par - Hep B vaccination advised and ordered Heplisav B injection\par Colonoscopy done in Aug 2021\par \par F/u in 6 months\par \par

## 2022-03-04 NOTE — HISTORY OF PRESENT ILLNESS
[de-identified] : Completed Epclusa and checked HCV RNA [de-identified] : Doing well. No jaundice abdominal pain confusion hematemesis or melena  [de-identified] : Hx of chronic hepatitis C started Epclusa treatment for 12 weeks in Sept and completed on Nov 28 th. \par HCV was detected during hospitalization. Was admitted in 2021 for prostatectomy and cystectomy for bladder and prostate cancer. \par No complaints\par

## 2022-03-21 NOTE — ED PROVIDER NOTE - CCCP TRG CHIEF CMPLNT
Patient has a procedure W/GW this week when she went to get her covid test there wasn't an order placed. She needs a call to see what she is to do now. Thank you.         rectal bleeding

## 2022-09-29 ENCOUNTER — APPOINTMENT (OUTPATIENT)
Dept: GASTROENTEROLOGY | Facility: CLINIC | Age: 77
End: 2022-09-29
Payer: MEDICARE

## 2022-09-29 VITALS
WEIGHT: 125 LBS | HEART RATE: 95 BPM | DIASTOLIC BLOOD PRESSURE: 70 MMHG | HEIGHT: 70 IN | OXYGEN SATURATION: 99 % | SYSTOLIC BLOOD PRESSURE: 148 MMHG | BODY MASS INDEX: 17.9 KG/M2

## 2022-09-29 DIAGNOSIS — B19.20 UNSPECIFIED VIRAL HEPATITIS C W/OUT HEPATIC COMA: ICD-10-CM

## 2022-09-29 DIAGNOSIS — Z87.898 PERSONAL HISTORY OF OTHER SPECIFIED CONDITIONS: ICD-10-CM

## 2022-09-29 DIAGNOSIS — K74.02 HEPATIC FIBROSIS, ADVANCED FIBROSIS: ICD-10-CM

## 2022-09-29 PROCEDURE — 99215 OFFICE O/P EST HI 40 MIN: CPT

## 2022-09-29 RX ORDER — HEPATITIS B VACCINE (RECOMBINANT) ADJUVANTED 20 UG/.5ML
20 INJECTION, SOLUTION INTRAMUSCULAR
Qty: 1 | Refills: 0 | Status: ACTIVE | COMMUNITY
Start: 2022-09-29 | End: 1900-01-01

## 2022-09-30 PROBLEM — Z87.898 HISTORY OF MARIJUANA USE: Status: ACTIVE | Noted: 2022-09-30

## 2022-09-30 PROBLEM — K74.02 ADVANCED HEPATIC FIBROSIS: Status: ACTIVE | Noted: 2022-03-03

## 2022-09-30 PROBLEM — B19.20 HEPATITIS-C: Status: ACTIVE | Noted: 2021-05-10

## 2022-09-30 NOTE — PHYSICAL EXAM
[General Appearance - Alert] : alert [General Appearance - Well Nourished] : well nourished [Sclera] : the sclera and conjunctiva were normal [Jugular Venous Distention Increased] : there was no jugular-venous distention [Respiration, Rhythm And Depth] : normal respiratory rhythm and effort [Auscultation Breath Sounds / Voice Sounds] : lungs were clear to auscultation bilaterally [Heart Sounds] : normal S1 and S2 [Heart Sounds Gallop] : no gallops [Abdomen Soft] : soft [Abdomen Tenderness] : non-tender [Nail Clubbing] : no clubbing  or cyanosis of the fingernails [] : no rash [No Focal Deficits] : no focal deficits [Oriented To Time, Place, And Person] : oriented to person, place, and time [Scleral Icterus] : No Scleral Icterus [Spider Angioma] : No spider angioma(s) were observed [Abdominal Bruit] : no abdominal bruit [Asterixis] : no asterixis observed [FreeTextEntry1] : urostomy

## 2022-09-30 NOTE — REVIEW OF SYSTEMS
[Negative] : Endocrine [Fever] : no fever [Chills] : no chills [Eyesight Problems] : no eyesight problems [Discharge From Eyes] : no purulent discharge from the eyes [Earache] : no earache [Chest Pain] : no chest pain [Palpitations] : no palpitations [Cough] : no cough [SOB on Exertion] : no shortness of breath during exertion [Abdominal Pain] : no abdominal pain [Vomiting] : no vomiting [Constipation] : no constipation [Diarrhea] : no diarrhea [Heartburn] : no heartburn [Melena] : no melena [Anxiety] : no anxiety [Depression] : no depression [Easy Bleeding] : no tendency for easy bleeding [Easy Bruising] : no tendency for easy bruising

## 2022-09-30 NOTE — ASSESSMENT
[FreeTextEntry1] : 76 year old  male with history of bladder and prostate cancer s/p cystectomy and prostatectomy hepatitis C treated Epclusa seen in follow up \par \par #Chronic hepatitis C with advanced fibrosis \par - 4/16/21 positive in hospital\par - Genotype 1A viral load 6.92, [id number]\par - CT AP did not show any cirrhosis. Hepatic granuloma\par - Pretreatment fibrosure F4 A0  fib 4 4.92 (F3-F4) but no cirrhosis on imaging \par    Fibroscan F3 fibrosis \par - HIV -ve\par - Hep B -ve\par - Started Eplcusa in Sept 2021 and completed 12 weeks Nov 28 th 2021\par -  SVR 12 confirmed August 2022. \par \par \par Pretreatment F4 fibrosis on Fibrosure\par During treatment F3 fibrosis on Fibroscan so will consider as cirrhosis \par \par MELD Na 10 Child  A\par \par HE  - none\par Ascites - none\par HCC screening - HCC screening every 6  months  AFP in August normal US abdomen ordered. \par \par Variceal screening  - no varices on EGD in 2021. Repeat in 2023 April\par Vaccination status - \par - Hepatitis a - has immunity \par - Hep B vaccination got first dose 6 months ago and ordered Heplisav B second dose. \par Coagulopathy - none\par LT referral - Low MELD and advanced age\par ABO status  AB+\par \par Nutrition \par Low Na diet Protein 1.5 g/kg\par Early breakfast late night snack small multiple meals\par No NSAID \par Limit tylenol intake to 2 g per day\par  Advised to avoid alcohol\par CBC CMP INR on follow up. \par \par Health maintenance\par Colonoscopy done in April 2021 and due for repeat colonoscopy. \par \par Vaccination \par Covid \par Zoster \par Pneumococcal \par \par Follow up in 6 months \par \par \par .\par \par \par

## 2022-09-30 NOTE — HISTORY OF PRESENT ILLNESS
[de-identified] : Doing well. No jaundice abdominal pain confusion hematemesis or melena  [de-identified] : Hx of chronic hepatitis C started Epclusa treatment for 12 weeks in Sept and completed on Nov 28 th. \par HCV was detected during hospitalization. Was admitted in 2021 for prostatectomy and cystectomy for bladder and prostate cancer. \par No complaints\par

## 2023-01-19 ENCOUNTER — APPOINTMENT (OUTPATIENT)
Dept: GASTROENTEROLOGY | Facility: CLINIC | Age: 78
End: 2023-01-19

## 2023-02-23 NOTE — CDI QUERY NOTE - NSCDI_DOCCLARIFY2_GEN_ALL_CORE_FT
Detail Level: Zone
Documentation clarification is required for accuracy in coding and billing, claim validation and reporting severity of illness, quality data and risk of mortality.

## 2023-04-18 LAB
HBV SURFACE AB SER QL: NONREACTIVE
HEPATITIS A IGG ANTIBODY: REACTIVE

## 2024-01-01 ENCOUNTER — INPATIENT (INPATIENT)
Facility: HOSPITAL | Age: 79
LOS: 3 days | DRG: 951 | End: 2024-09-18
Attending: NEUROLOGICAL SURGERY | Admitting: NEUROLOGICAL SURGERY
Payer: COMMERCIAL

## 2024-01-01 VITALS
WEIGHT: 132.28 LBS | RESPIRATION RATE: 24 BRPM | HEART RATE: 108 BPM | SYSTOLIC BLOOD PRESSURE: 268 MMHG | TEMPERATURE: 99 F | DIASTOLIC BLOOD PRESSURE: 135 MMHG | OXYGEN SATURATION: 100 %

## 2024-01-01 VITALS — OXYGEN SATURATION: 100 % | HEART RATE: 62 BPM | RESPIRATION RATE: 16 BRPM

## 2024-01-01 DIAGNOSIS — Z90.6 ACQUIRED ABSENCE OF OTHER PARTS OF URINARY TRACT: Chronic | ICD-10-CM

## 2024-01-01 DIAGNOSIS — Z71.89 OTHER SPECIFIED COUNSELING: ICD-10-CM

## 2024-01-01 DIAGNOSIS — Z90.79 ACQUIRED ABSENCE OF OTHER GENITAL ORGAN(S): Chronic | ICD-10-CM

## 2024-01-01 DIAGNOSIS — Z51.5 ENCOUNTER FOR PALLIATIVE CARE: ICD-10-CM

## 2024-01-01 DIAGNOSIS — J96.01 ACUTE RESPIRATORY FAILURE WITH HYPOXIA: ICD-10-CM

## 2024-01-01 DIAGNOSIS — I77.74 DISSECTION OF VERTEBRAL ARTERY: ICD-10-CM

## 2024-01-01 DIAGNOSIS — G40.901 EPILEPSY, UNSPECIFIED, NOT INTRACTABLE, WITH STATUS EPILEPTICUS: ICD-10-CM

## 2024-01-01 LAB
A1C WITH ESTIMATED AVERAGE GLUCOSE RESULT: 5.3 % — SIGNIFICANT CHANGE UP (ref 4–5.6)
ALBUMIN SERPL ELPH-MCNC: 3.1 G/DL — LOW (ref 3.5–5.2)
ALBUMIN SERPL ELPH-MCNC: 3.8 G/DL — SIGNIFICANT CHANGE UP (ref 3.5–5.2)
ALBUMIN SERPL ELPH-MCNC: 4 G/DL — SIGNIFICANT CHANGE UP (ref 3.5–5.2)
ALBUMIN SERPL ELPH-MCNC: 4.4 G/DL — SIGNIFICANT CHANGE UP (ref 3.5–5.2)
ALP SERPL-CCNC: 104 U/L — SIGNIFICANT CHANGE UP (ref 30–115)
ALP SERPL-CCNC: 119 U/L — HIGH (ref 30–115)
ALP SERPL-CCNC: 125 U/L — HIGH (ref 30–115)
ALP SERPL-CCNC: 99 U/L — SIGNIFICANT CHANGE UP (ref 30–115)
ALT FLD-CCNC: 10 U/L — SIGNIFICANT CHANGE UP (ref 0–41)
ALT FLD-CCNC: 10 U/L — SIGNIFICANT CHANGE UP (ref 0–41)
ALT FLD-CCNC: 11 U/L — SIGNIFICANT CHANGE UP (ref 0–41)
ALT FLD-CCNC: <5 U/L — SIGNIFICANT CHANGE UP (ref 0–41)
AMMONIA BLD-MCNC: 24 UMOL/L — SIGNIFICANT CHANGE UP (ref 11–55)
AMPHET UR-MCNC: NEGATIVE — SIGNIFICANT CHANGE UP
ANION GAP SERPL CALC-SCNC: 13 MMOL/L — SIGNIFICANT CHANGE UP (ref 7–14)
ANION GAP SERPL CALC-SCNC: 15 MMOL/L — HIGH (ref 7–14)
ANION GAP SERPL CALC-SCNC: 16 MMOL/L — HIGH (ref 7–14)
ANION GAP SERPL CALC-SCNC: 21 MMOL/L — HIGH (ref 7–14)
APPEARANCE UR: ABNORMAL
APPEARANCE UR: ABNORMAL
APTT BLD: 31.7 SEC — SIGNIFICANT CHANGE UP (ref 27–39.2)
APTT BLD: 51.1 SEC — HIGH (ref 27–39.2)
AST SERPL-CCNC: 18 U/L — SIGNIFICANT CHANGE UP (ref 0–41)
AST SERPL-CCNC: 32 U/L — SIGNIFICANT CHANGE UP (ref 0–41)
AST SERPL-CCNC: 52 U/L — HIGH (ref 0–41)
AST SERPL-CCNC: 67 U/L — HIGH (ref 0–41)
BACTERIA # UR AUTO: ABNORMAL /HPF
BARBITURATES UR SCN-MCNC: NEGATIVE — SIGNIFICANT CHANGE UP
BASE EXCESS BLDA CALC-SCNC: -0.4 MMOL/L — SIGNIFICANT CHANGE UP (ref -2–3)
BASE EXCESS BLDA CALC-SCNC: -0.8 MMOL/L — SIGNIFICANT CHANGE UP (ref -2–3)
BASE EXCESS BLDA CALC-SCNC: 0.9 MMOL/L — SIGNIFICANT CHANGE UP (ref -2–3)
BASE EXCESS BLDA CALC-SCNC: 1.8 MMOL/L — SIGNIFICANT CHANGE UP (ref -2–3)
BASE EXCESS BLDA CALC-SCNC: 2.2 MMOL/L — SIGNIFICANT CHANGE UP (ref -2–3)
BASE EXCESS BLDV CALC-SCNC: -6.5 MMOL/L — LOW (ref -2–3)
BASOPHILS # BLD AUTO: 0 K/UL — SIGNIFICANT CHANGE UP (ref 0–0.2)
BASOPHILS # BLD AUTO: 0.01 K/UL — SIGNIFICANT CHANGE UP (ref 0–0.2)
BASOPHILS # BLD AUTO: 0.02 K/UL — SIGNIFICANT CHANGE UP (ref 0–0.2)
BASOPHILS # BLD AUTO: 0.02 K/UL — SIGNIFICANT CHANGE UP (ref 0–0.2)
BASOPHILS NFR BLD AUTO: 0 % — SIGNIFICANT CHANGE UP (ref 0–1)
BASOPHILS NFR BLD AUTO: 0.1 % — SIGNIFICANT CHANGE UP (ref 0–1)
BASOPHILS NFR BLD AUTO: 0.2 % — SIGNIFICANT CHANGE UP (ref 0–1)
BASOPHILS NFR BLD AUTO: 0.3 % — SIGNIFICANT CHANGE UP (ref 0–1)
BENZODIAZ UR-MCNC: POSITIVE
BILIRUB SERPL-MCNC: 0.4 MG/DL — SIGNIFICANT CHANGE UP (ref 0.2–1.2)
BILIRUB SERPL-MCNC: 0.4 MG/DL — SIGNIFICANT CHANGE UP (ref 0.2–1.2)
BILIRUB SERPL-MCNC: 0.5 MG/DL — SIGNIFICANT CHANGE UP (ref 0.2–1.2)
BILIRUB SERPL-MCNC: 0.5 MG/DL — SIGNIFICANT CHANGE UP (ref 0.2–1.2)
BILIRUB UR-MCNC: NEGATIVE — SIGNIFICANT CHANGE UP
BILIRUB UR-MCNC: NEGATIVE — SIGNIFICANT CHANGE UP
BLD GP AB SCN SERPL QL: SIGNIFICANT CHANGE UP
BUN SERPL-MCNC: 15 MG/DL — SIGNIFICANT CHANGE UP (ref 10–20)
BUN SERPL-MCNC: 21 MG/DL — HIGH (ref 10–20)
BUN SERPL-MCNC: 35 MG/DL — HIGH (ref 10–20)
BUN SERPL-MCNC: 43 MG/DL — HIGH (ref 10–20)
CA-I SERPL-SCNC: 1.21 MMOL/L — SIGNIFICANT CHANGE UP (ref 1.15–1.33)
CALCIUM SERPL-MCNC: 8.4 MG/DL — SIGNIFICANT CHANGE UP (ref 8.4–10.5)
CALCIUM SERPL-MCNC: 8.4 MG/DL — SIGNIFICANT CHANGE UP (ref 8.4–10.5)
CALCIUM SERPL-MCNC: 8.7 MG/DL — SIGNIFICANT CHANGE UP (ref 8.4–10.5)
CALCIUM SERPL-MCNC: 9.1 MG/DL — SIGNIFICANT CHANGE UP (ref 8.4–10.5)
CARBOXYTHC UR CFM-MCNC: 571 NG/ML — HIGH
CAST: 1 /LPF — SIGNIFICANT CHANGE UP (ref 0–4)
CHLORIDE SERPL-SCNC: 102 MMOL/L — SIGNIFICANT CHANGE UP (ref 98–110)
CHLORIDE SERPL-SCNC: 104 MMOL/L — SIGNIFICANT CHANGE UP (ref 98–110)
CHLORIDE SERPL-SCNC: 105 MMOL/L — SIGNIFICANT CHANGE UP (ref 98–110)
CHLORIDE SERPL-SCNC: 105 MMOL/L — SIGNIFICANT CHANGE UP (ref 98–110)
CHOLEST SERPL-MCNC: 134 MG/DL — SIGNIFICANT CHANGE UP
CK SERPL-CCNC: 75 U/L — SIGNIFICANT CHANGE UP (ref 0–225)
CO2 SERPL-SCNC: 16 MMOL/L — LOW (ref 17–32)
CO2 SERPL-SCNC: 20 MMOL/L — SIGNIFICANT CHANGE UP (ref 17–32)
CO2 SERPL-SCNC: 24 MMOL/L — SIGNIFICANT CHANGE UP (ref 17–32)
CO2 SERPL-SCNC: 25 MMOL/L — SIGNIFICANT CHANGE UP (ref 17–32)
COCAINE METAB.OTHER UR-MCNC: NEGATIVE — SIGNIFICANT CHANGE UP
COLOR SPEC: YELLOW — SIGNIFICANT CHANGE UP
COLOR SPEC: YELLOW — SIGNIFICANT CHANGE UP
CREAT SERPL-MCNC: 1.4 MG/DL — SIGNIFICANT CHANGE UP (ref 0.7–1.5)
CREAT SERPL-MCNC: 1.5 MG/DL — SIGNIFICANT CHANGE UP (ref 0.7–1.5)
CREAT SERPL-MCNC: 1.8 MG/DL — HIGH (ref 0.7–1.5)
CREAT SERPL-MCNC: 1.8 MG/DL — HIGH (ref 0.7–1.5)
CULTURE RESULTS: SIGNIFICANT CHANGE UP
DIFF PNL FLD: ABNORMAL
DIFF PNL FLD: NEGATIVE — SIGNIFICANT CHANGE UP
DRUG SCREEN 1, URINE RESULT: SIGNIFICANT CHANGE UP
EGFR: 38 ML/MIN/1.73M2 — LOW
EGFR: 38 ML/MIN/1.73M2 — LOW
EGFR: 47 ML/MIN/1.73M2 — LOW
EGFR: 51 ML/MIN/1.73M2 — LOW
EOSINOPHIL # BLD AUTO: 0 K/UL — SIGNIFICANT CHANGE UP (ref 0–0.7)
EOSINOPHIL # BLD AUTO: 0 K/UL — SIGNIFICANT CHANGE UP (ref 0–0.7)
EOSINOPHIL # BLD AUTO: 0.02 K/UL — SIGNIFICANT CHANGE UP (ref 0–0.7)
EOSINOPHIL # BLD AUTO: 0.03 K/UL — SIGNIFICANT CHANGE UP (ref 0–0.7)
EOSINOPHIL NFR BLD AUTO: 0 % — SIGNIFICANT CHANGE UP (ref 0–8)
EOSINOPHIL NFR BLD AUTO: 0 % — SIGNIFICANT CHANGE UP (ref 0–8)
EOSINOPHIL NFR BLD AUTO: 0.3 % — SIGNIFICANT CHANGE UP (ref 0–8)
EOSINOPHIL NFR BLD AUTO: 0.3 % — SIGNIFICANT CHANGE UP (ref 0–8)
ESTIMATED AVERAGE GLUCOSE: 105 MG/DL — SIGNIFICANT CHANGE UP (ref 68–114)
ETHANOL SERPL-MCNC: <10 MG/DL — SIGNIFICANT CHANGE UP
FENTANYL UR QL: NEGATIVE — SIGNIFICANT CHANGE UP
GAS PNL BLDA: SIGNIFICANT CHANGE UP
GAS PNL BLDV: 137 MMOL/L — SIGNIFICANT CHANGE UP (ref 136–145)
GAS PNL BLDV: SIGNIFICANT CHANGE UP
GAS PNL BLDV: SIGNIFICANT CHANGE UP
GLUCOSE BLDC GLUCOMTR-MCNC: 115 MG/DL — HIGH (ref 70–99)
GLUCOSE SERPL-MCNC: 108 MG/DL — HIGH (ref 70–99)
GLUCOSE SERPL-MCNC: 116 MG/DL — HIGH (ref 70–99)
GLUCOSE SERPL-MCNC: 124 MG/DL — HIGH (ref 70–99)
GLUCOSE SERPL-MCNC: 96 MG/DL — SIGNIFICANT CHANGE UP (ref 70–99)
GLUCOSE UR QL: NEGATIVE MG/DL — SIGNIFICANT CHANGE UP
GLUCOSE UR QL: NEGATIVE MG/DL — SIGNIFICANT CHANGE UP
HCO3 BLDA-SCNC: 21 MMOL/L — SIGNIFICANT CHANGE UP (ref 21–28)
HCO3 BLDA-SCNC: 24 MMOL/L — SIGNIFICANT CHANGE UP (ref 21–28)
HCO3 BLDA-SCNC: 25 MMOL/L — SIGNIFICANT CHANGE UP (ref 21–28)
HCO3 BLDA-SCNC: 26 MMOL/L — SIGNIFICANT CHANGE UP (ref 21–28)
HCO3 BLDA-SCNC: 26 MMOL/L — SIGNIFICANT CHANGE UP (ref 21–28)
HCO3 BLDV-SCNC: 27 MMOL/L — SIGNIFICANT CHANGE UP (ref 22–29)
HCT VFR BLD CALC: 30.7 % — LOW (ref 42–52)
HCT VFR BLD CALC: 33.9 % — LOW (ref 42–52)
HCT VFR BLD CALC: 34.7 % — LOW (ref 42–52)
HCT VFR BLD CALC: 46.2 % — SIGNIFICANT CHANGE UP (ref 42–52)
HCT VFR BLDA CALC: 47 % — SIGNIFICANT CHANGE UP (ref 39–51)
HDLC SERPL-MCNC: 42 MG/DL — SIGNIFICANT CHANGE UP
HGB BLD CALC-MCNC: 15.5 G/DL — SIGNIFICANT CHANGE UP (ref 12.6–17.4)
HGB BLD-MCNC: 10.2 G/DL — LOW (ref 14–18)
HGB BLD-MCNC: 11.2 G/DL — LOW (ref 14–18)
HGB BLD-MCNC: 11.2 G/DL — LOW (ref 14–18)
HGB BLD-MCNC: 14.9 G/DL — SIGNIFICANT CHANGE UP (ref 14–18)
HOROWITZ INDEX BLDA+IHG-RTO: 40 — SIGNIFICANT CHANGE UP
IMM GRANULOCYTES NFR BLD AUTO: 0.4 % — HIGH (ref 0.1–0.3)
IMM GRANULOCYTES NFR BLD AUTO: 0.8 % — HIGH (ref 0.1–0.3)
IMM GRANULOCYTES NFR BLD AUTO: 0.9 % — HIGH (ref 0.1–0.3)
IMM GRANULOCYTES NFR BLD AUTO: 1.3 % — HIGH (ref 0.1–0.3)
INR BLD: 1.01 RATIO — SIGNIFICANT CHANGE UP (ref 0.65–1.3)
INR BLD: 1.02 RATIO — SIGNIFICANT CHANGE UP (ref 0.65–1.3)
KETONES UR-MCNC: ABNORMAL MG/DL
KETONES UR-MCNC: NEGATIVE MG/DL — SIGNIFICANT CHANGE UP
LACTATE BLDV-MCNC: 5.6 MMOL/L — CRITICAL HIGH (ref 0.5–2)
LACTATE SERPL-SCNC: 5.3 MMOL/L — CRITICAL HIGH (ref 0.7–2)
LEUKOCYTE ESTERASE UR-ACNC: ABNORMAL
LEUKOCYTE ESTERASE UR-ACNC: ABNORMAL
LIPID PNL WITH DIRECT LDL SERPL: 55 MG/DL — SIGNIFICANT CHANGE UP
LYMPHOCYTES # BLD AUTO: 0.4 K/UL — LOW (ref 1.2–3.4)
LYMPHOCYTES # BLD AUTO: 0.46 K/UL — LOW (ref 1.2–3.4)
LYMPHOCYTES # BLD AUTO: 0.51 K/UL — LOW (ref 1.2–3.4)
LYMPHOCYTES # BLD AUTO: 2.35 K/UL — SIGNIFICANT CHANGE UP (ref 1.2–3.4)
LYMPHOCYTES # BLD AUTO: 25.4 % — SIGNIFICANT CHANGE UP (ref 20.5–51.1)
LYMPHOCYTES # BLD AUTO: 4.7 % — LOW (ref 20.5–51.1)
LYMPHOCYTES # BLD AUTO: 6.6 % — LOW (ref 20.5–51.1)
LYMPHOCYTES # BLD AUTO: 8.8 % — LOW (ref 20.5–51.1)
MAGNESIUM SERPL-MCNC: 1.7 MG/DL — LOW (ref 1.8–2.4)
MAGNESIUM SERPL-MCNC: 2.4 MG/DL — SIGNIFICANT CHANGE UP (ref 1.8–2.4)
MAGNESIUM SERPL-MCNC: 2.4 MG/DL — SIGNIFICANT CHANGE UP (ref 1.8–2.4)
MCHC RBC-ENTMCNC: 26.5 PG — LOW (ref 27–31)
MCHC RBC-ENTMCNC: 26.7 PG — LOW (ref 27–31)
MCHC RBC-ENTMCNC: 26.7 PG — LOW (ref 27–31)
MCHC RBC-ENTMCNC: 26.8 PG — LOW (ref 27–31)
MCHC RBC-ENTMCNC: 32.3 G/DL — SIGNIFICANT CHANGE UP (ref 32–37)
MCHC RBC-ENTMCNC: 32.3 G/DL — SIGNIFICANT CHANGE UP (ref 32–37)
MCHC RBC-ENTMCNC: 33 G/DL — SIGNIFICANT CHANGE UP (ref 32–37)
MCHC RBC-ENTMCNC: 33.2 G/DL — SIGNIFICANT CHANGE UP (ref 32–37)
MCV RBC AUTO: 80.6 FL — SIGNIFICANT CHANGE UP (ref 80–94)
MCV RBC AUTO: 80.7 FL — SIGNIFICANT CHANGE UP (ref 80–94)
MCV RBC AUTO: 82.1 FL — SIGNIFICANT CHANGE UP (ref 80–94)
MCV RBC AUTO: 82.6 FL — SIGNIFICANT CHANGE UP (ref 80–94)
METHADONE UR-MCNC: NEGATIVE — SIGNIFICANT CHANGE UP
MONOCYTES # BLD AUTO: 0.27 K/UL — SIGNIFICANT CHANGE UP (ref 0.1–0.6)
MONOCYTES # BLD AUTO: 0.38 K/UL — SIGNIFICANT CHANGE UP (ref 0.1–0.6)
MONOCYTES # BLD AUTO: 0.49 K/UL — SIGNIFICANT CHANGE UP (ref 0.1–0.6)
MONOCYTES # BLD AUTO: 0.64 K/UL — HIGH (ref 0.1–0.6)
MONOCYTES NFR BLD AUTO: 3.5 % — SIGNIFICANT CHANGE UP (ref 1.7–9.3)
MONOCYTES NFR BLD AUTO: 5.8 % — SIGNIFICANT CHANGE UP (ref 1.7–9.3)
MONOCYTES NFR BLD AUTO: 6.9 % — SIGNIFICANT CHANGE UP (ref 1.7–9.3)
MONOCYTES NFR BLD AUTO: 7.3 % — SIGNIFICANT CHANGE UP (ref 1.7–9.3)
MRSA PCR RESULT.: NEGATIVE — SIGNIFICANT CHANGE UP
NEUTROPHILS # BLD AUTO: 4.38 K/UL — SIGNIFICANT CHANGE UP (ref 1.4–6.5)
NEUTROPHILS # BLD AUTO: 6.14 K/UL — SIGNIFICANT CHANGE UP (ref 1.4–6.5)
NEUTROPHILS # BLD AUTO: 6.85 K/UL — HIGH (ref 1.4–6.5)
NEUTROPHILS # BLD AUTO: 7.5 K/UL — HIGH (ref 1.4–6.5)
NEUTROPHILS NFR BLD AUTO: 66.4 % — SIGNIFICANT CHANGE UP (ref 42.2–75.2)
NEUTROPHILS NFR BLD AUTO: 83.5 % — HIGH (ref 42.2–75.2)
NEUTROPHILS NFR BLD AUTO: 88.1 % — HIGH (ref 42.2–75.2)
NEUTROPHILS NFR BLD AUTO: 88.4 % — HIGH (ref 42.2–75.2)
NITRITE UR-MCNC: NEGATIVE — SIGNIFICANT CHANGE UP
NITRITE UR-MCNC: POSITIVE
NON HDL CHOLESTEROL: 92 MG/DL — SIGNIFICANT CHANGE UP
NRBC # BLD: 0 /100 WBCS — SIGNIFICANT CHANGE UP (ref 0–0)
OPIATES UR-MCNC: NEGATIVE — SIGNIFICANT CHANGE UP
OXYCODONE UR-MCNC: NEGATIVE — SIGNIFICANT CHANGE UP
PCO2 BLDA: 27 MMHG — LOW (ref 35–48)
PCO2 BLDA: 33 MMHG — LOW (ref 35–48)
PCO2 BLDA: 34 MMHG — LOW (ref 35–48)
PCO2 BLDA: 37 MMHG — SIGNIFICANT CHANGE UP (ref 35–48)
PCO2 BLDA: 48 MMHG — SIGNIFICANT CHANGE UP (ref 35–48)
PCO2 BLDV: 101 MMHG — CRITICAL HIGH (ref 42–55)
PCP UR-MCNC: NEGATIVE — SIGNIFICANT CHANGE UP
PH BLDA: 7.34 — LOW (ref 7.35–7.45)
PH BLDA: 7.45 — SIGNIFICANT CHANGE UP (ref 7.35–7.45)
PH BLDA: 7.47 — HIGH (ref 7.35–7.45)
PH BLDA: 7.48 — HIGH (ref 7.35–7.45)
PH BLDA: 7.5 — HIGH (ref 7.35–7.45)
PH BLDV: 7.04 — CRITICAL LOW (ref 7.32–7.43)
PH UR: 7.5 — SIGNIFICANT CHANGE UP (ref 5–8)
PH UR: >=9 (ref 5–8)
PHOSPHATE SERPL-MCNC: 3.2 MG/DL — SIGNIFICANT CHANGE UP (ref 2.1–4.9)
PHOSPHATE SERPL-MCNC: 4.8 MG/DL — SIGNIFICANT CHANGE UP (ref 2.1–4.9)
PHOSPHATE SERPL-MCNC: 5.6 MG/DL — HIGH (ref 2.1–4.9)
PLATELET # BLD AUTO: 113 K/UL — LOW (ref 130–400)
PLATELET # BLD AUTO: 89 K/UL — LOW (ref 130–400)
PLATELET # BLD AUTO: 90 K/UL — LOW (ref 130–400)
PLATELET # BLD AUTO: 91 K/UL — LOW (ref 130–400)
PMV BLD: 11 FL — HIGH (ref 7.4–10.4)
PMV BLD: 11.3 FL — HIGH (ref 7.4–10.4)
PMV BLD: 11.4 FL — HIGH (ref 7.4–10.4)
PMV BLD: 11.8 FL — HIGH (ref 7.4–10.4)
PO2 BLDA: 193 MMHG — HIGH (ref 83–108)
PO2 BLDA: 207 MMHG — HIGH (ref 83–108)
PO2 BLDA: 208 MMHG — HIGH (ref 83–108)
PO2 BLDA: 224 MMHG — HIGH (ref 83–108)
PO2 BLDA: 227 MMHG — HIGH (ref 83–108)
PO2 BLDV: 43 MMHG — SIGNIFICANT CHANGE UP (ref 25–45)
POTASSIUM BLDV-SCNC: 3.7 MMOL/L — SIGNIFICANT CHANGE UP (ref 3.5–5.1)
POTASSIUM SERPL-MCNC: 3.7 MMOL/L — SIGNIFICANT CHANGE UP (ref 3.5–5)
POTASSIUM SERPL-MCNC: 3.8 MMOL/L — SIGNIFICANT CHANGE UP (ref 3.5–5)
POTASSIUM SERPL-MCNC: 4.1 MMOL/L — SIGNIFICANT CHANGE UP (ref 3.5–5)
POTASSIUM SERPL-MCNC: 4.2 MMOL/L — SIGNIFICANT CHANGE UP (ref 3.5–5)
POTASSIUM SERPL-SCNC: 3.7 MMOL/L — SIGNIFICANT CHANGE UP (ref 3.5–5)
POTASSIUM SERPL-SCNC: 3.8 MMOL/L — SIGNIFICANT CHANGE UP (ref 3.5–5)
POTASSIUM SERPL-SCNC: 4.1 MMOL/L — SIGNIFICANT CHANGE UP (ref 3.5–5)
POTASSIUM SERPL-SCNC: 4.2 MMOL/L — SIGNIFICANT CHANGE UP (ref 3.5–5)
PROCALCITONIN SERPL-MCNC: 0.41 NG/ML — HIGH (ref 0.02–0.1)
PROCALCITONIN SERPL-MCNC: 0.79 NG/ML — HIGH (ref 0.02–0.1)
PROPOXYPHENE QUALITATIVE URINE RESULT: NEGATIVE — SIGNIFICANT CHANGE UP
PROT SERPL-MCNC: 5.5 G/DL — LOW (ref 6–8)
PROT SERPL-MCNC: 6.1 G/DL — SIGNIFICANT CHANGE UP (ref 6–8)
PROT SERPL-MCNC: 6.2 G/DL — SIGNIFICANT CHANGE UP (ref 6–8)
PROT SERPL-MCNC: 7.5 G/DL — SIGNIFICANT CHANGE UP (ref 6–8)
PROT UR-MCNC: 100 MG/DL
PROT UR-MCNC: 300 MG/DL
PROTHROM AB SERPL-ACNC: 11.5 SEC — SIGNIFICANT CHANGE UP (ref 9.95–12.87)
PROTHROM AB SERPL-ACNC: 11.6 SEC — SIGNIFICANT CHANGE UP (ref 9.95–12.87)
RBC # BLD: 3.81 M/UL — LOW (ref 4.7–6.1)
RBC # BLD: 4.2 M/UL — LOW (ref 4.7–6.1)
RBC # BLD: 4.2 M/UL — LOW (ref 4.7–6.1)
RBC # BLD: 5.63 M/UL — SIGNIFICANT CHANGE UP (ref 4.7–6.1)
RBC # FLD: 16 % — HIGH (ref 11.5–14.5)
RBC # FLD: 16.1 % — HIGH (ref 11.5–14.5)
RBC # FLD: 16.6 % — HIGH (ref 11.5–14.5)
RBC # FLD: 16.8 % — HIGH (ref 11.5–14.5)
RBC CASTS # UR COMP ASSIST: 12 /HPF — HIGH (ref 0–4)
SAO2 % BLDA: 100 % — HIGH (ref 94–98)
SAO2 % BLDA: 100 % — HIGH (ref 94–98)
SAO2 % BLDA: 99.6 % — HIGH (ref 94–98)
SAO2 % BLDV: 53.7 % — LOW (ref 67–88)
SODIUM SERPL-SCNC: 141 MMOL/L — SIGNIFICANT CHANGE UP (ref 135–146)
SODIUM SERPL-SCNC: 141 MMOL/L — SIGNIFICANT CHANGE UP (ref 135–146)
SODIUM SERPL-SCNC: 142 MMOL/L — SIGNIFICANT CHANGE UP (ref 135–146)
SODIUM SERPL-SCNC: 142 MMOL/L — SIGNIFICANT CHANGE UP (ref 135–146)
SP GR SPEC: 1.02 — SIGNIFICANT CHANGE UP (ref 1–1.03)
SP GR SPEC: >1.03 — HIGH (ref 1–1.03)
SPECIMEN SOURCE: SIGNIFICANT CHANGE UP
SQUAMOUS # UR AUTO: 1 /HPF — SIGNIFICANT CHANGE UP (ref 0–5)
T4 FREE SERPL-MCNC: 0.9 NG/DL — SIGNIFICANT CHANGE UP (ref 0.9–1.8)
THC UR QL: POSITIVE
TOXICOLOGIST REVIEW: POSITIVE — SIGNIFICANT CHANGE UP
TRIGL SERPL-MCNC: 184 MG/DL — HIGH
TROPONIN T, HIGH SENSITIVITY RESULT: 17 NG/L — SIGNIFICANT CHANGE UP (ref 6–21)
TSH SERPL-MCNC: 2.92 UIU/ML — SIGNIFICANT CHANGE UP (ref 0.27–4.2)
UROBILINOGEN FLD QL: 1 MG/DL — SIGNIFICANT CHANGE UP (ref 0.2–1)
UROBILINOGEN FLD QL: 1 MG/DL — SIGNIFICANT CHANGE UP (ref 0.2–1)
VALPROATE SERPL-MCNC: 81 UG/ML — SIGNIFICANT CHANGE UP (ref 50–100)
WBC # BLD: 5.24 K/UL — SIGNIFICANT CHANGE UP (ref 4.8–10.8)
WBC # BLD: 7.74 K/UL — SIGNIFICANT CHANGE UP (ref 4.8–10.8)
WBC # BLD: 8.51 K/UL — SIGNIFICANT CHANGE UP (ref 4.8–10.8)
WBC # BLD: 9.25 K/UL — SIGNIFICANT CHANGE UP (ref 4.8–10.8)
WBC # FLD AUTO: 5.24 K/UL — SIGNIFICANT CHANGE UP (ref 4.8–10.8)
WBC # FLD AUTO: 7.74 K/UL — SIGNIFICANT CHANGE UP (ref 4.8–10.8)
WBC # FLD AUTO: 8.51 K/UL — SIGNIFICANT CHANGE UP (ref 4.8–10.8)
WBC # FLD AUTO: 9.25 K/UL — SIGNIFICANT CHANGE UP (ref 4.8–10.8)
WBC UR QL: 51 /HPF — HIGH (ref 0–5)

## 2024-01-01 PROCEDURE — 74177 CT ABD & PELVIS W/CONTRAST: CPT | Mod: 26,MC

## 2024-01-01 PROCEDURE — 84100 ASSAY OF PHOSPHORUS: CPT

## 2024-01-01 PROCEDURE — 70498 CT ANGIOGRAPHY NECK: CPT | Mod: 26,MC

## 2024-01-01 PROCEDURE — 80346 BENZODIAZEPINES1-12: CPT

## 2024-01-01 PROCEDURE — 70544 MR ANGIOGRAPHY HEAD W/O DYE: CPT

## 2024-01-01 PROCEDURE — 95714 VEEG EA 12-26 HR UNMNTR: CPT

## 2024-01-01 PROCEDURE — 85018 HEMOGLOBIN: CPT

## 2024-01-01 PROCEDURE — 85730 THROMBOPLASTIN TIME PARTIAL: CPT

## 2024-01-01 PROCEDURE — 70547 MR ANGIOGRAPHY NECK W/O DYE: CPT

## 2024-01-01 PROCEDURE — 80307 DRUG TEST PRSMV CHEM ANLYZR: CPT

## 2024-01-01 PROCEDURE — 84145 PROCALCITONIN (PCT): CPT

## 2024-01-01 PROCEDURE — 87086 URINE CULTURE/COLONY COUNT: CPT

## 2024-01-01 PROCEDURE — 93970 EXTREMITY STUDY: CPT | Mod: 26

## 2024-01-01 PROCEDURE — 81001 URINALYSIS AUTO W/SCOPE: CPT

## 2024-01-01 PROCEDURE — 99223 1ST HOSP IP/OBS HIGH 75: CPT

## 2024-01-01 PROCEDURE — 87640 STAPH A DNA AMP PROBE: CPT

## 2024-01-01 PROCEDURE — C9254: CPT

## 2024-01-01 PROCEDURE — 85610 PROTHROMBIN TIME: CPT

## 2024-01-01 PROCEDURE — 84439 ASSAY OF FREE THYROXINE: CPT

## 2024-01-01 PROCEDURE — 80164 ASSAY DIPROPYLACETIC ACD TOT: CPT

## 2024-01-01 PROCEDURE — 72125 CT NECK SPINE W/O DYE: CPT | Mod: 26,MC

## 2024-01-01 PROCEDURE — 99497 ADVNCD CARE PLAN 30 MIN: CPT

## 2024-01-01 PROCEDURE — 84132 ASSAY OF SERUM POTASSIUM: CPT

## 2024-01-01 PROCEDURE — 71260 CT THORAX DX C+: CPT | Mod: 26,MC

## 2024-01-01 PROCEDURE — 83036 HEMOGLOBIN GLYCOSYLATED A1C: CPT

## 2024-01-01 PROCEDURE — 85014 HEMATOCRIT: CPT

## 2024-01-01 PROCEDURE — 82140 ASSAY OF AMMONIA: CPT

## 2024-01-01 PROCEDURE — 71045 X-RAY EXAM CHEST 1 VIEW: CPT | Mod: 26,77

## 2024-01-01 PROCEDURE — 70551 MRI BRAIN STEM W/O DYE: CPT | Mod: MC

## 2024-01-01 PROCEDURE — 70496 CT ANGIOGRAPHY HEAD: CPT | Mod: 26,MC

## 2024-01-01 PROCEDURE — 71045 X-RAY EXAM CHEST 1 VIEW: CPT

## 2024-01-01 PROCEDURE — 99291 CRITICAL CARE FIRST HOUR: CPT

## 2024-01-01 PROCEDURE — 94003 VENT MGMT INPAT SUBQ DAY: CPT

## 2024-01-01 PROCEDURE — 83605 ASSAY OF LACTIC ACID: CPT

## 2024-01-01 PROCEDURE — 95700 EEG CONT REC W/VID EEG TECH: CPT

## 2024-01-01 PROCEDURE — 99291 CRITICAL CARE FIRST HOUR: CPT | Mod: 25

## 2024-01-01 PROCEDURE — 95720 EEG PHY/QHP EA INCR W/VEEG: CPT

## 2024-01-01 PROCEDURE — 93306 TTE W/DOPPLER COMPLETE: CPT | Mod: 26

## 2024-01-01 PROCEDURE — 87641 MR-STAPH DNA AMP PROBE: CPT

## 2024-01-01 PROCEDURE — 71045 X-RAY EXAM CHEST 1 VIEW: CPT | Mod: 26,76

## 2024-01-01 PROCEDURE — 94760 N-INVAS EAR/PLS OXIMETRY 1: CPT

## 2024-01-01 PROCEDURE — 0042T: CPT | Mod: MC

## 2024-01-01 PROCEDURE — 71045 X-RAY EXAM CHEST 1 VIEW: CPT | Mod: 26

## 2024-01-01 PROCEDURE — 80053 COMPREHEN METABOLIC PANEL: CPT

## 2024-01-01 PROCEDURE — 82330 ASSAY OF CALCIUM: CPT

## 2024-01-01 PROCEDURE — 93010 ELECTROCARDIOGRAM REPORT: CPT

## 2024-01-01 PROCEDURE — 82962 GLUCOSE BLOOD TEST: CPT

## 2024-01-01 PROCEDURE — 36620 INSERTION CATHETER ARTERY: CPT

## 2024-01-01 PROCEDURE — 80349 CANNABINOIDS NATURAL: CPT

## 2024-01-01 PROCEDURE — 70551 MRI BRAIN STEM W/O DYE: CPT | Mod: 26

## 2024-01-01 PROCEDURE — 36415 COLL VENOUS BLD VENIPUNCTURE: CPT

## 2024-01-01 PROCEDURE — 99233 SBSQ HOSP IP/OBS HIGH 50: CPT | Mod: 25

## 2024-01-01 PROCEDURE — 70544 MR ANGIOGRAPHY HEAD W/O DYE: CPT | Mod: 26,1L,59

## 2024-01-01 PROCEDURE — 70547 MR ANGIOGRAPHY NECK W/O DYE: CPT | Mod: 26

## 2024-01-01 PROCEDURE — 80061 LIPID PANEL: CPT

## 2024-01-01 PROCEDURE — 93970 EXTREMITY STUDY: CPT

## 2024-01-01 PROCEDURE — 80354 DRUG SCREENING FENTANYL: CPT

## 2024-01-01 PROCEDURE — 70450 CT HEAD/BRAIN W/O DYE: CPT | Mod: 26,59,MC

## 2024-01-01 PROCEDURE — 93306 TTE W/DOPPLER COMPLETE: CPT

## 2024-01-01 PROCEDURE — 83735 ASSAY OF MAGNESIUM: CPT

## 2024-01-01 PROCEDURE — 82803 BLOOD GASES ANY COMBINATION: CPT

## 2024-01-01 PROCEDURE — 84443 ASSAY THYROID STIM HORMONE: CPT

## 2024-01-01 PROCEDURE — 84295 ASSAY OF SERUM SODIUM: CPT

## 2024-01-01 PROCEDURE — 85025 COMPLETE CBC W/AUTO DIFF WBC: CPT

## 2024-01-01 RX ORDER — ENOXAPARIN SODIUM 100 MG/ML
30 INJECTION SUBCUTANEOUS EVERY 24 HOURS
Refills: 0 | Status: DISCONTINUED | OUTPATIENT
Start: 2024-01-01 | End: 2024-01-01

## 2024-01-01 RX ORDER — POVIDONE, PROPYLENE GLYCOL 6.8; 3 MG/ML; MG/ML
1 LIQUID OPHTHALMIC
Refills: 0 | Status: DISCONTINUED | OUTPATIENT
Start: 2024-01-01 | End: 2024-01-01

## 2024-01-01 RX ORDER — MIDAZOLAM HYDROCHLORIDE 5 MG/ML
2 INJECTION, SOLUTION INTRAMUSCULAR; INTRAVENOUS ONCE
Refills: 0 | Status: DISCONTINUED | OUTPATIENT
Start: 2024-01-01 | End: 2024-01-01

## 2024-01-01 RX ORDER — LEVETIRACETAM 1000 MG/1
1000 TABLET ORAL EVERY 12 HOURS
Refills: 0 | Status: DISCONTINUED | OUTPATIENT
Start: 2024-01-01 | End: 2024-01-01

## 2024-01-01 RX ORDER — FENTANYL CITRATE 50 UG/ML
0.5 INJECTION INTRAMUSCULAR; INTRAVENOUS
Qty: 2500 | Refills: 0 | Status: DISCONTINUED | OUTPATIENT
Start: 2024-01-01 | End: 2024-01-01

## 2024-01-01 RX ORDER — SODIUM CHLORIDE 9 MG/ML
500 INJECTION INTRAMUSCULAR; INTRAVENOUS; SUBCUTANEOUS ONCE
Refills: 0 | Status: COMPLETED | OUTPATIENT
Start: 2024-01-01 | End: 2024-01-01

## 2024-01-01 RX ORDER — CHLORHEXIDINE GLUCONATE 40 MG/ML
15 SOLUTION TOPICAL EVERY 12 HOURS
Refills: 0 | Status: DISCONTINUED | OUTPATIENT
Start: 2024-01-01 | End: 2024-01-01

## 2024-01-01 RX ORDER — LEVETIRACETAM 1000 MG/1
500 TABLET ORAL EVERY 12 HOURS
Refills: 0 | Status: DISCONTINUED | OUTPATIENT
Start: 2024-01-01 | End: 2024-01-01

## 2024-01-01 RX ORDER — MIDAZOLAM HYDROCHLORIDE 5 MG/ML
0.02 INJECTION, SOLUTION INTRAMUSCULAR; INTRAVENOUS
Qty: 100 | Refills: 0 | Status: DISCONTINUED | OUTPATIENT
Start: 2024-01-01 | End: 2024-01-01

## 2024-01-01 RX ORDER — LACOSAMIDE 200 MG/1
100 TABLET, FILM COATED ORAL
Refills: 0 | Status: DISCONTINUED | OUTPATIENT
Start: 2024-01-01 | End: 2024-01-01

## 2024-01-01 RX ORDER — NICARDIPINE HCL 20 MG
5 CAPSULE ORAL
Qty: 40 | Refills: 0 | Status: DISCONTINUED | OUTPATIENT
Start: 2024-01-01 | End: 2024-01-01

## 2024-01-01 RX ORDER — FENTANYL CITRATE 50 UG/ML
25 INJECTION INTRAMUSCULAR; INTRAVENOUS
Refills: 0 | Status: DISCONTINUED | OUTPATIENT
Start: 2024-01-01 | End: 2024-01-01

## 2024-01-01 RX ORDER — POLYETHYLENE GLYCOL 3350 17 G/17G
17 POWDER, FOR SOLUTION ORAL DAILY
Refills: 0 | Status: DISCONTINUED | OUTPATIENT
Start: 2024-01-01 | End: 2024-01-01

## 2024-01-01 RX ORDER — ROPIVACAINE IN 0.9% SOD CHL/PF 0.1 %
0.05 PLASTIC BAG, INJECTION (ML) EPIDURAL
Qty: 8 | Refills: 0 | Status: DISCONTINUED | OUTPATIENT
Start: 2024-01-01 | End: 2024-01-01

## 2024-01-01 RX ORDER — CHLORHEXIDINE GLUCONATE 40 MG/ML
1 SOLUTION TOPICAL
Refills: 0 | Status: DISCONTINUED | OUTPATIENT
Start: 2024-01-01 | End: 2024-01-01

## 2024-01-01 RX ORDER — SODIUM CHLORIDE 9 MG/ML
250 INJECTION INTRAMUSCULAR; INTRAVENOUS; SUBCUTANEOUS ONCE
Refills: 0 | Status: COMPLETED | OUTPATIENT
Start: 2024-01-01 | End: 2024-01-01

## 2024-01-01 RX ORDER — HYDROMORPHONE HYDROCHLORIDE 2 MG/1
1 TABLET ORAL ONCE
Refills: 0 | Status: DISCONTINUED | OUTPATIENT
Start: 2024-01-01 | End: 2024-01-01

## 2024-01-01 RX ORDER — LORAZEPAM 4 MG/ML
2 INJECTION INTRAMUSCULAR; INTRAVENOUS
Refills: 0 | Status: DISCONTINUED | OUTPATIENT
Start: 2024-01-01 | End: 2024-01-01

## 2024-01-01 RX ORDER — VALPROIC ACID 250 MG
500 CAPSULE ORAL EVERY 12 HOURS
Refills: 0 | Status: DISCONTINUED | OUTPATIENT
Start: 2024-01-01 | End: 2024-01-01

## 2024-01-01 RX ORDER — ROPIVACAINE IN 0.9% SOD CHL/PF 0.1 %
0.06 PLASTIC BAG, INJECTION (ML) EPIDURAL
Qty: 8 | Refills: 0 | Status: DISCONTINUED | OUTPATIENT
Start: 2024-01-01 | End: 2024-01-01

## 2024-01-01 RX ORDER — LACOSAMIDE 200 MG/1
200 TABLET, FILM COATED ORAL ONCE
Refills: 0 | Status: DISCONTINUED | OUTPATIENT
Start: 2024-01-01 | End: 2024-01-01

## 2024-01-01 RX ORDER — GLYCOPYRROLATE 0.2 MG/ML
0.4 INJECTION INTRAMUSCULAR; INTRAVENOUS ONCE
Refills: 0 | Status: COMPLETED | OUTPATIENT
Start: 2024-01-01 | End: 2024-01-01

## 2024-01-01 RX ORDER — POLYETHYLENE GLYCOL 3350 17 G/17G
17 POWDER, FOR SOLUTION ORAL EVERY 12 HOURS
Refills: 0 | Status: DISCONTINUED | OUTPATIENT
Start: 2024-01-01 | End: 2024-01-01

## 2024-01-01 RX ORDER — LORAZEPAM 4 MG/ML
2 INJECTION INTRAMUSCULAR; INTRAVENOUS ONCE
Refills: 0 | Status: DISCONTINUED | OUTPATIENT
Start: 2024-01-01 | End: 2024-01-01

## 2024-01-01 RX ORDER — MIDAZOLAM HYDROCHLORIDE 5 MG/ML
0.05 INJECTION, SOLUTION INTRAMUSCULAR; INTRAVENOUS
Qty: 100 | Refills: 0 | Status: DISCONTINUED | OUTPATIENT
Start: 2024-01-01 | End: 2024-01-01

## 2024-01-01 RX ORDER — PROPOFOL 10 MG/ML
45 INJECTION, EMULSION INTRAVENOUS
Qty: 1000 | Refills: 0 | Status: DISCONTINUED | OUTPATIENT
Start: 2024-01-01 | End: 2024-01-01

## 2024-01-01 RX ORDER — TETANUS TOXOID, REDUCED DIPHTHERIA TOXOID AND ACELLULAR PERTUSSIS VACCINE, ADSORBED 5; 2.5; 8; 8; 2.5 [IU]/.5ML; [IU]/.5ML; UG/.5ML; UG/.5ML; UG/.5ML
0.5 SUSPENSION INTRAMUSCULAR ONCE
Refills: 0 | Status: COMPLETED | OUTPATIENT
Start: 2024-01-01 | End: 2024-01-01

## 2024-01-01 RX ORDER — MIDAZOLAM HYDROCHLORIDE 5 MG/ML
3 INJECTION, SOLUTION INTRAMUSCULAR; INTRAVENOUS ONCE
Refills: 0 | Status: DISCONTINUED | OUTPATIENT
Start: 2024-01-01 | End: 2024-01-01

## 2024-01-01 RX ORDER — LEVETIRACETAM 1000 MG/1
2000 TABLET ORAL ONCE
Refills: 0 | Status: DISCONTINUED | OUTPATIENT
Start: 2024-01-01 | End: 2024-01-01

## 2024-01-01 RX ORDER — LEVETIRACETAM 1000 MG/1
2000 TABLET ORAL ONCE
Refills: 0 | Status: COMPLETED | OUTPATIENT
Start: 2024-01-01 | End: 2024-01-01

## 2024-01-01 RX ORDER — FAMOTIDINE 10 MG/ML
20 INJECTION INTRAVENOUS DAILY
Refills: 0 | Status: DISCONTINUED | OUTPATIENT
Start: 2024-01-01 | End: 2024-01-01

## 2024-01-01 RX ORDER — SENNA 187 MG
2 TABLET ORAL EVERY 12 HOURS
Refills: 0 | Status: DISCONTINUED | OUTPATIENT
Start: 2024-01-01 | End: 2024-01-01

## 2024-01-01 RX ORDER — HEPARIN SODIUM,BOVINE 1000/ML
5000 VIAL (ML) INJECTION EVERY 12 HOURS
Refills: 0 | Status: DISCONTINUED | OUTPATIENT
Start: 2024-01-01 | End: 2024-01-01

## 2024-01-01 RX ORDER — PROPOFOL 10 MG/ML
10 INJECTION, EMULSION INTRAVENOUS
Qty: 1000 | Refills: 0 | Status: DISCONTINUED | OUTPATIENT
Start: 2024-01-01 | End: 2024-01-01

## 2024-01-01 RX ORDER — MIDAZOLAM HYDROCHLORIDE 5 MG/ML
0.1 INJECTION, SOLUTION INTRAMUSCULAR; INTRAVENOUS
Qty: 100 | Refills: 0 | Status: DISCONTINUED | OUTPATIENT
Start: 2024-01-01 | End: 2024-01-01

## 2024-01-01 RX ORDER — VALPROIC ACID 250 MG
500 CAPSULE ORAL EVERY 8 HOURS
Refills: 0 | Status: DISCONTINUED | OUTPATIENT
Start: 2024-01-01 | End: 2024-01-01

## 2024-01-01 RX ORDER — SENNA 187 MG
2 TABLET ORAL AT BEDTIME
Refills: 0 | Status: DISCONTINUED | OUTPATIENT
Start: 2024-01-01 | End: 2024-01-01

## 2024-01-01 RX ORDER — HYDROMORPHONE HYDROCHLORIDE 2 MG/1
1 TABLET ORAL
Refills: 0 | Status: DISCONTINUED | OUTPATIENT
Start: 2024-01-01 | End: 2024-01-01

## 2024-01-01 RX ORDER — LEVETIRACETAM 1000 MG/1
1000 TABLET ORAL
Refills: 0 | Status: DISCONTINUED | OUTPATIENT
Start: 2024-01-01 | End: 2024-01-01

## 2024-01-01 RX ORDER — ACETAMINOPHEN 325 MG/1
650 TABLET ORAL EVERY 6 HOURS
Refills: 0 | Status: DISCONTINUED | OUTPATIENT
Start: 2024-01-01 | End: 2024-01-01

## 2024-01-01 RX ORDER — FENTANYL CITRATE 50 UG/ML
12.5 INJECTION INTRAMUSCULAR; INTRAVENOUS
Refills: 0 | Status: DISCONTINUED | OUTPATIENT
Start: 2024-01-01 | End: 2024-01-01

## 2024-01-01 RX ORDER — LEVETIRACETAM 1000 MG/1
1000 TABLET ORAL ONCE
Refills: 0 | Status: COMPLETED | OUTPATIENT
Start: 2024-01-01 | End: 2024-01-01

## 2024-01-01 RX ORDER — POTASSIUM CHLORIDE 10 MEQ
20 TABLET, EXT RELEASE, PARTICLES/CRYSTALS ORAL ONCE
Refills: 0 | Status: COMPLETED | OUTPATIENT
Start: 2024-01-01 | End: 2024-01-01

## 2024-01-01 RX ORDER — VALPROIC ACID 250 MG
1250 CAPSULE ORAL ONCE
Refills: 0 | Status: COMPLETED | OUTPATIENT
Start: 2024-01-01 | End: 2024-01-01

## 2024-01-01 RX ORDER — POVIDONE, PROPYLENE GLYCOL 6.8; 3 MG/ML; MG/ML
1 LIQUID OPHTHALMIC EVERY 6 HOURS
Refills: 0 | Status: DISCONTINUED | OUTPATIENT
Start: 2024-01-01 | End: 2024-01-01

## 2024-01-01 RX ORDER — BRIVARACETAM 100 MG/1
100 TABLET, FILM COATED ORAL
Refills: 0 | Status: DISCONTINUED | OUTPATIENT
Start: 2024-01-01 | End: 2024-01-01

## 2024-01-01 RX ORDER — ASPIRIN 81 MG
324 TABLET, DELAYED RELEASE (ENTERIC COATED) ORAL ONCE
Refills: 0 | Status: COMPLETED | OUTPATIENT
Start: 2024-01-01 | End: 2024-01-01

## 2024-01-01 RX ORDER — ASPIRIN 81 MG
81 TABLET, DELAYED RELEASE (ENTERIC COATED) ORAL DAILY
Refills: 0 | Status: DISCONTINUED | OUTPATIENT
Start: 2024-01-01 | End: 2024-01-01

## 2024-01-01 RX ORDER — PROPOFOL 10 MG/ML
60 INJECTION, EMULSION INTRAVENOUS ONCE
Refills: 0 | Status: COMPLETED | OUTPATIENT
Start: 2024-01-01 | End: 2024-01-01

## 2024-01-01 RX ORDER — MIDAZOLAM HYDROCHLORIDE 5 MG/ML
0.2 INJECTION, SOLUTION INTRAMUSCULAR; INTRAVENOUS
Qty: 100 | Refills: 0 | Status: DISCONTINUED | OUTPATIENT
Start: 2024-01-01 | End: 2024-01-01

## 2024-01-01 RX ADMIN — Medication 100 MILLIGRAM(S): at 20:09

## 2024-01-01 RX ADMIN — CHLORHEXIDINE GLUCONATE 15 MILLILITER(S): 40 SOLUTION TOPICAL at 05:01

## 2024-01-01 RX ADMIN — Medication 5000 UNIT(S): at 05:01

## 2024-01-01 RX ADMIN — POVIDONE, PROPYLENE GLYCOL 1 DROP(S): 6.8; 3 LIQUID OPHTHALMIC at 23:02

## 2024-01-01 RX ADMIN — MIDAZOLAM HYDROCHLORIDE 2 MILLIGRAM(S): 5 INJECTION, SOLUTION INTRAMUSCULAR; INTRAVENOUS at 15:03

## 2024-01-01 RX ADMIN — MIDAZOLAM HYDROCHLORIDE 1.2 MG/KG/HR: 5 INJECTION, SOLUTION INTRAMUSCULAR; INTRAVENOUS at 15:41

## 2024-01-01 RX ADMIN — Medication 5000 UNIT(S): at 18:20

## 2024-01-01 RX ADMIN — Medication 500 MILLILITER(S): at 13:41

## 2024-01-01 RX ADMIN — Medication 20 MILLIEQUIVALENT(S): at 06:48

## 2024-01-01 RX ADMIN — SODIUM CHLORIDE 500 MILLILITER(S): 9 INJECTION INTRAMUSCULAR; INTRAVENOUS; SUBCUTANEOUS at 08:30

## 2024-01-01 RX ADMIN — Medication 81 MILLIGRAM(S): at 11:22

## 2024-01-01 RX ADMIN — Medication 2 TABLET(S): at 22:20

## 2024-01-01 RX ADMIN — CHLORHEXIDINE GLUCONATE 15 MILLILITER(S): 40 SOLUTION TOPICAL at 06:38

## 2024-01-01 RX ADMIN — LORAZEPAM 2 MILLIGRAM(S): 4 INJECTION INTRAMUSCULAR; INTRAVENOUS at 12:45

## 2024-01-01 RX ADMIN — Medication 5000 UNIT(S): at 05:03

## 2024-01-01 RX ADMIN — PROPOFOL 3.6 MICROGRAM(S)/KG/MIN: 10 INJECTION, EMULSION INTRAVENOUS at 17:24

## 2024-01-01 RX ADMIN — POVIDONE, PROPYLENE GLYCOL 1 DROP(S): 6.8; 3 LIQUID OPHTHALMIC at 13:44

## 2024-01-01 RX ADMIN — MIDAZOLAM HYDROCHLORIDE 2 MILLIGRAM(S): 5 INJECTION, SOLUTION INTRAMUSCULAR; INTRAVENOUS at 15:04

## 2024-01-01 RX ADMIN — LEVETIRACETAM 400 MILLIGRAM(S): 1000 TABLET ORAL at 13:59

## 2024-01-01 RX ADMIN — CHLORHEXIDINE GLUCONATE 15 MILLILITER(S): 40 SOLUTION TOPICAL at 05:03

## 2024-01-01 RX ADMIN — ACETAMINOPHEN 650 MILLIGRAM(S): 325 TABLET ORAL at 19:43

## 2024-01-01 RX ADMIN — GLYCOPYRROLATE 0.4 MILLIGRAM(S): 0.2 INJECTION INTRAMUSCULAR; INTRAVENOUS at 11:53

## 2024-01-01 RX ADMIN — POVIDONE, PROPYLENE GLYCOL 1 APPLICATION(S): 6.8; 3 LIQUID OPHTHALMIC at 13:08

## 2024-01-01 RX ADMIN — FENTANYL CITRATE 12.5 MICROGRAM(S): 50 INJECTION INTRAMUSCULAR; INTRAVENOUS at 19:43

## 2024-01-01 RX ADMIN — POVIDONE, PROPYLENE GLYCOL 1 APPLICATION(S): 6.8; 3 LIQUID OPHTHALMIC at 17:47

## 2024-01-01 RX ADMIN — LACOSAMIDE 100 MILLIGRAM(S): 200 TABLET, FILM COATED ORAL at 05:04

## 2024-01-01 RX ADMIN — LEVETIRACETAM 500 MILLIGRAM(S): 1000 TABLET ORAL at 18:06

## 2024-01-01 RX ADMIN — CHLORHEXIDINE GLUCONATE 1 APPLICATION(S): 40 SOLUTION TOPICAL at 05:04

## 2024-01-01 RX ADMIN — MIDAZOLAM HYDROCHLORIDE 3 MILLIGRAM(S): 5 INJECTION, SOLUTION INTRAMUSCULAR; INTRAVENOUS at 17:28

## 2024-01-01 RX ADMIN — POVIDONE, PROPYLENE GLYCOL 1 APPLICATION(S): 6.8; 3 LIQUID OPHTHALMIC at 05:03

## 2024-01-01 RX ADMIN — POVIDONE, PROPYLENE GLYCOL 1 APPLICATION(S): 6.8; 3 LIQUID OPHTHALMIC at 00:17

## 2024-01-01 RX ADMIN — POVIDONE, PROPYLENE GLYCOL 1 DROP(S): 6.8; 3 LIQUID OPHTHALMIC at 05:01

## 2024-01-01 RX ADMIN — LEVETIRACETAM 400 MILLIGRAM(S): 1000 TABLET ORAL at 05:01

## 2024-01-01 RX ADMIN — FAMOTIDINE 20 MILLIGRAM(S): 10 INJECTION INTRAVENOUS at 11:11

## 2024-01-01 RX ADMIN — PROPOFOL 60 MILLIGRAM(S): 10 INJECTION, EMULSION INTRAVENOUS at 15:35

## 2024-01-01 RX ADMIN — LEVETIRACETAM 500 MILLIGRAM(S): 1000 TABLET ORAL at 05:04

## 2024-01-01 RX ADMIN — CHLORHEXIDINE GLUCONATE 15 MILLILITER(S): 40 SOLUTION TOPICAL at 17:42

## 2024-01-01 RX ADMIN — SODIUM CHLORIDE 500 MILLILITER(S): 9 INJECTION INTRAMUSCULAR; INTRAVENOUS; SUBCUTANEOUS at 17:24

## 2024-01-01 RX ADMIN — Medication 55 MILLIGRAM(S): at 17:43

## 2024-01-01 RX ADMIN — MIDAZOLAM HYDROCHLORIDE 10.4 MG/KG/HR: 5 INJECTION, SOLUTION INTRAMUSCULAR; INTRAVENOUS at 13:43

## 2024-01-01 RX ADMIN — MIDAZOLAM HYDROCHLORIDE 10.4 MG/KG/HR: 5 INJECTION, SOLUTION INTRAMUSCULAR; INTRAVENOUS at 21:43

## 2024-01-01 RX ADMIN — CHLORHEXIDINE GLUCONATE 1 APPLICATION(S): 40 SOLUTION TOPICAL at 05:01

## 2024-01-01 RX ADMIN — PROPOFOL 14 MICROGRAM(S)/KG/MIN: 10 INJECTION, EMULSION INTRAVENOUS at 13:42

## 2024-01-01 RX ADMIN — Medication 5.63 MICROGRAM(S)/KG/MIN: at 21:36

## 2024-01-01 RX ADMIN — LORAZEPAM 2 MILLIGRAM(S): 4 INJECTION INTRAMUSCULAR; INTRAVENOUS at 11:53

## 2024-01-01 RX ADMIN — LEVETIRACETAM 400 MILLIGRAM(S): 1000 TABLET ORAL at 17:24

## 2024-01-01 RX ADMIN — Medication 55 MILLIGRAM(S): at 15:02

## 2024-01-01 RX ADMIN — Medication 81 MILLIGRAM(S): at 11:01

## 2024-01-01 RX ADMIN — Medication 81 MILLIGRAM(S): at 13:08

## 2024-01-01 RX ADMIN — FENTANYL CITRATE 3 MICROGRAM(S)/KG/HR: 50 INJECTION INTRAMUSCULAR; INTRAVENOUS at 15:01

## 2024-01-01 RX ADMIN — POVIDONE, PROPYLENE GLYCOL 1 APPLICATION(S): 6.8; 3 LIQUID OPHTHALMIC at 00:34

## 2024-01-01 RX ADMIN — LEVETIRACETAM 400 MILLIGRAM(S): 1000 TABLET ORAL at 21:05

## 2024-01-01 RX ADMIN — TETANUS TOXOID, REDUCED DIPHTHERIA TOXOID AND ACELLULAR PERTUSSIS VACCINE, ADSORBED 0.5 MILLILITER(S): 5; 2.5; 8; 8; 2.5 SUSPENSION INTRAMUSCULAR at 15:05

## 2024-01-01 RX ADMIN — Medication 300 MILLIGRAM(S): at 19:43

## 2024-01-01 RX ADMIN — LACOSAMIDE 100 MILLIGRAM(S): 200 TABLET, FILM COATED ORAL at 13:08

## 2024-01-01 RX ADMIN — LEVETIRACETAM 400 MILLIGRAM(S): 1000 TABLET ORAL at 05:03

## 2024-01-01 RX ADMIN — SODIUM CHLORIDE 500 MILLILITER(S): 9 INJECTION INTRAMUSCULAR; INTRAVENOUS; SUBCUTANEOUS at 11:02

## 2024-01-01 RX ADMIN — Medication 10 MILLIGRAM(S): at 17:09

## 2024-01-01 RX ADMIN — CHLORHEXIDINE GLUCONATE 1 APPLICATION(S): 40 SOLUTION TOPICAL at 06:37

## 2024-01-01 RX ADMIN — Medication 2 TABLET(S): at 21:13

## 2024-01-01 RX ADMIN — POLYETHYLENE GLYCOL 3350 17 GRAM(S): 17 POWDER, FOR SOLUTION ORAL at 05:03

## 2024-01-01 RX ADMIN — FENTANYL CITRATE 12.5 MICROGRAM(S): 50 INJECTION INTRAMUSCULAR; INTRAVENOUS at 20:00

## 2024-01-01 RX ADMIN — Medication 25 GRAM(S): at 06:41

## 2024-01-01 RX ADMIN — Medication 75 MILLIGRAM(S): at 11:22

## 2024-01-01 RX ADMIN — Medication 5.63 MICROGRAM(S)/KG/MIN: at 17:22

## 2024-01-01 RX ADMIN — POLYETHYLENE GLYCOL 3350 17 GRAM(S): 17 POWDER, FOR SOLUTION ORAL at 11:11

## 2024-01-01 RX ADMIN — Medication 5000 UNIT(S): at 17:21

## 2024-01-01 RX ADMIN — LACOSAMIDE 100 MILLIGRAM(S): 200 TABLET, FILM COATED ORAL at 17:42

## 2024-01-01 RX ADMIN — PROPOFOL 3.6 MICROGRAM(S)/KG/MIN: 10 INJECTION, EMULSION INTRAVENOUS at 16:35

## 2024-01-01 RX ADMIN — Medication 324 MILLIGRAM(S): at 17:25

## 2024-01-01 RX ADMIN — LEVETIRACETAM 500 MILLIGRAM(S): 1000 TABLET ORAL at 18:20

## 2024-01-01 RX ADMIN — ACETAMINOPHEN 650 MILLIGRAM(S): 325 TABLET ORAL at 22:00

## 2024-01-01 RX ADMIN — LACOSAMIDE 200 MILLIGRAM(S): 200 TABLET, FILM COATED ORAL at 10:10

## 2024-01-01 RX ADMIN — Medication 55 MILLIGRAM(S): at 22:22

## 2024-01-01 RX ADMIN — Medication 5 MILLIGRAM(S): at 14:40

## 2024-01-01 RX ADMIN — MIDAZOLAM HYDROCHLORIDE 10.4 MG/KG/HR: 5 INJECTION, SOLUTION INTRAMUSCULAR; INTRAVENOUS at 16:03

## 2024-01-01 RX ADMIN — Medication 100 MILLIGRAM(S): at 18:41

## 2024-01-01 RX ADMIN — CHLORHEXIDINE GLUCONATE 1 APPLICATION(S): 40 SOLUTION TOPICAL at 05:02

## 2024-01-01 RX ADMIN — POLYETHYLENE GLYCOL 3350 17 GRAM(S): 17 POWDER, FOR SOLUTION ORAL at 11:21

## 2024-01-01 RX ADMIN — Medication 112.5 MILLIGRAM(S): at 13:41

## 2024-01-01 RX ADMIN — POVIDONE, PROPYLENE GLYCOL 1 DROP(S): 6.8; 3 LIQUID OPHTHALMIC at 21:04

## 2024-01-01 RX ADMIN — Medication 2 TABLET(S): at 21:05

## 2024-01-01 RX ADMIN — CHLORHEXIDINE GLUCONATE 15 MILLILITER(S): 40 SOLUTION TOPICAL at 17:25

## 2024-01-01 RX ADMIN — CHLORHEXIDINE GLUCONATE 15 MILLILITER(S): 40 SOLUTION TOPICAL at 17:21

## 2024-01-01 RX ADMIN — Medication 100 MILLIGRAM(S): at 19:43

## 2024-01-01 RX ADMIN — Medication 5.63 MICROGRAM(S)/KG/MIN: at 11:21

## 2024-01-01 RX ADMIN — POVIDONE, PROPYLENE GLYCOL 1 APPLICATION(S): 6.8; 3 LIQUID OPHTHALMIC at 06:38

## 2024-01-01 RX ADMIN — LEVETIRACETAM 400 MILLIGRAM(S): 1000 TABLET ORAL at 23:29

## 2024-01-01 RX ADMIN — PROPOFOL 14 MICROGRAM(S)/KG/MIN: 10 INJECTION, EMULSION INTRAVENOUS at 18:42

## 2024-01-01 RX ADMIN — PROPOFOL 14 MICROGRAM(S)/KG/MIN: 10 INJECTION, EMULSION INTRAVENOUS at 05:54

## 2024-01-01 RX ADMIN — CHLORHEXIDINE GLUCONATE 15 MILLILITER(S): 40 SOLUTION TOPICAL at 18:20

## 2024-01-01 RX ADMIN — FAMOTIDINE 20 MILLIGRAM(S): 10 INJECTION INTRAVENOUS at 11:22

## 2024-01-01 RX ADMIN — MIDAZOLAM HYDROCHLORIDE 10.4 MG/KG/HR: 5 INJECTION, SOLUTION INTRAMUSCULAR; INTRAVENOUS at 05:54

## 2024-01-01 RX ADMIN — Medication 55 MILLIGRAM(S): at 05:03

## 2024-01-01 RX ADMIN — LEVETIRACETAM 1000 MILLIGRAM(S): 1000 TABLET ORAL at 15:31

## 2024-01-01 RX ADMIN — Medication 5000 UNIT(S): at 06:37

## 2024-01-01 RX ADMIN — POLYETHYLENE GLYCOL 3350 17 GRAM(S): 17 POWDER, FOR SOLUTION ORAL at 17:42

## 2024-01-01 RX ADMIN — Medication 5000 UNIT(S): at 17:42

## 2024-01-01 RX ADMIN — LEVETIRACETAM 500 MILLIGRAM(S): 1000 TABLET ORAL at 06:37

## 2024-01-01 RX ADMIN — Medication 55 MILLIGRAM(S): at 06:42

## 2024-01-01 RX ADMIN — HYDROMORPHONE HYDROCHLORIDE 1 MILLIGRAM(S): 2 TABLET ORAL at 11:53

## 2024-01-01 RX ADMIN — FENTANYL CITRATE 25 MICROGRAM(S): 50 INJECTION INTRAMUSCULAR; INTRAVENOUS at 14:53

## 2024-01-01 RX ADMIN — FAMOTIDINE 20 MILLIGRAM(S): 10 INJECTION INTRAVENOUS at 13:08
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
## 2024-09-14 NOTE — STROKE CODE NOTE - IV ALTEPLASE EXCLUSION ABS OTHER
Unknown last known well not a candidate for IV thrombolytics, no LVO not a candidate for IA intervention.

## 2024-09-14 NOTE — ED PROVIDER NOTE - CONSULTANT FREE TEXT FOR MDM DISCUSSED CASE WITH QUESTION
Trauma, Neurology, NCC Trauma, Neurology, NCC  Dr. Garcia- reports admission to Dr. Katz under [facility] ICU

## 2024-09-14 NOTE — ED PROVIDER NOTE - CARE PLAN
1 Principal Discharge DX:	Vertebral artery dissection  Secondary Diagnosis:	Status epilepticus  Secondary Diagnosis:	Acute respiratory failure with hypoxia and hypercapnia  Secondary Diagnosis:	Multiple skin tears
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
## 2024-09-14 NOTE — ED ADULT NURSE NOTE - NS ED NOTE ABUSE RESPONSE YN
3/24/2022        David Ryan Riggins  [address] [facility] [address]    Dear Parent of David:    Unfortunately we have been unable to reach you by phone to notify you about David's Adbominal Ultrasound results .    Please call our office at Dept: [phone] and talk with my nurse Camelia for further information and instructions.    Sincerely,        Samreen Houston MD  [address]  [facility] [address]       Unable to assess due to medical condition

## 2024-09-14 NOTE — ED PROVIDER NOTE - PHYSICAL EXAMINATION
VITAL SIGNS: I have reviewed nursing notes and confirm.  CONSTITUTIONAL: non responsive  SKIN: pale  HEAD: NCAT  EYES: pupils 2 mm, non responsive.   ENT: Александр tube in place  CARD: RRR  RESP: clear to ausculation, assisted  EXT: no obvious deformities  NEURO: non responsive.

## 2024-09-14 NOTE — ED PROVIDER NOTE - DIFFERENTIAL DIAGNOSIS
The differential diagnosis for patients clinical presentation includes but is not limited to:  traumatic injury, ich, fracture, cva, tia, hypertensive emergency, metabolic vs infectious etiology Differential Diagnosis

## 2024-09-14 NOTE — CONSULT NOTE ADULT - ASSESSMENT
ASSESSMENT:    Status epilepticus   Possible L vertebral dissection   Severe carotid stenosis   HO bladder and prostate ca SP cystectomy and prostatectomy   THC abuse     PLAN:   NEURO:  - Neuro checks/NIHSS/Coma checks q1hrs  - CTH/CTA/CTP reviewed   - CTA with possible dissection reviewed; severe focal stenosis   - MRI brain when stable  - vEEG   - SP Keppra 3000mg in the ED   - CW versed and propofol for seizures  - ICU delirium precautions  - Antiemetics: Zofran prn  - Urine and serum drug screen   Activity: [] Increase as tolerated [x] Bedrest [X] PT [X] OT [] PMNR    PULM:  - lung protective vent settings   - SAT/SBT as tolerated  - VAP protocol  - Keep plateau pressure < 30 to maintain venous drainage  - Aggressive chest PT/pulmonary toileting/NT suctioning as needed   - HOB > 45 degrees  - Aspiration precautions  - Keep SaO2 > 93%  - CT chest noted with debris in the airway; likely aspiration during seizure  - daily CXR for now    CV:  - Keep  to <150  - Telemetry monitoring  - 12-lead ECG reviewed   - TTE/2D echo  - Lipid profile  - Daily statin    RENAL:  - Strict I/Os, daily weights  - Keep euvolemic  - Keep normonatremic   - Keep Magnesium level > 2; Potassium > 4  - Monitor lytes, replete as needed  - IVF/IVL when tolerating PO intake    GI:  Diet: Dysphagia screen and then advance diet as tolerated  GI prophylaxis [x] not indicated [] PPI [] other:  Bowel regimen [X] Miralax [X] senna [] other:    ENDO:   - Goal euglycemia (-180)  - HgA1c, TSH    HEME/ONC:  VTE prophylaxis: [X] SCDs [] chemoprophylaxis [] hold chemoprophylaxis due to: [] high risk of DVT/PE on admission due to:  - Load  x1 now, and 81 mg daily for possible dissection   - VA Duplex B/L LE    ID:  - Keep normothermic, avoid fevers  - post-op ABTx    MISC:  PT/OT/SLP    CODE STATUS:  [] Full Code [x] DNR [] DNI [] Palliative/Comfort Care  Discussed in great detail     DISPOSITION:  [X] NCCU [] Stroke Unit [] Floor [] EMU [] RCU [] PCU                 ASSESSMENT:    Status epilepticus   Possible L vertebral dissection   Severe carotid stenosis   HO bladder and prostate ca SP cystectomy and prostatectomy   THC abuse     PLAN:   NEURO:  - Neuro checks checks q1hrs  - CTH/CTA/CTP reviewed   - CTA with possible dissection reviewed; severe focal stenosis   - MRI brain when stable  - IR eval for possible dissection   - vEEG   - SP Keppra 3000mg in the ED  - CW keppra 1g q8h    - CW versed and propofol for seizures  - ICU delirium precautions  - Antiemetics: Zofran prn  - Urine and serum drug screen   Activity: [] Increase as tolerated [x] Bedrest [X] PT [X] OT [] PMNR    PULM:  - lung protective vent settings   - SAT/SBT as tolerated  - VAP protocol  - Keep plateau pressure < 30 to maintain venous drainage  - Aggressive chest PT/pulmonary toileting/NT suctioning as needed   - HOB > 45 degrees  - Aspiration precautions  - Keep SaO2 > 93%  - CT chest noted with debris in the airway; likely aspiration during seizure  - daily CXR for now    CV:  - Keep  to <150  - Telemetry monitoring  - 12-lead ECG reviewed   - TTE/2D echo  - Lipid profile    RENAL:  - Strict I/Os, daily weights  - Keep euvolemic  - Keep normonatremic   - Keep Magnesium level > 2; Potassium > 4  - Monitor lytes, replete as needed  - IVF/IVL when tolerating PO intake    GI:  Diet: Dysphagia screen and then advance diet as tolerated  GI prophylaxis [x] not indicated [] PPI [] other:  Bowel regimen [X] Miralax [X] senna [] other:    ENDO:   - Goal euglycemia (-180)  - HgA1c, TSH    HEME/ONC:  VTE prophylaxis: [X] SCDs [] chemoprophylaxis [] hold chemoprophylaxis due to: [] high risk of DVT/PE on admission due to:  - Load  x1 now, and 81 mg daily for possible dissection   - VA Duplex B/L LE    ID:  - Keep normothermic, avoid fevers  - post-op ABTx    MISC:  PT/OT/SLP    CODE STATUS:  [] Full Code [x] DNR [] DNI [] Palliative/Comfort Care  Discussed in great detail     DISPOSITION:  [X] NCCU [] Stroke Unit [] Floor [] EMU [] RCU [] PCU                 ASSESSMENT:    Status epilepticus   Possible L vertebral dissection   Severe carotid stenosis   HO bladder and prostate ca SP cystectomy and prostatectomy   THC abuse     PLAN:   NEURO:  - Neuro checks checks q1hrs  - CTH/CTA/CTP reviewed   - CTA with possible dissection reviewed; severe focal stenosis   - MRI / MRA brain and neck  with axial T1 fat suppression to R/O vertbral artery dissection- Non dominant  L vert along distal V1 and V2 with regions of steno-occlusion suspicion for dissection. Will obtain further detailed imaging . CTP with no region of mismatch and would place on anti- plt monotherapy at this time.   - IR eval for possible dissection   -  Continuous vEEG   - SP Keppra 3000mg in the ED  - CW keppra 1g q8h    - CW versed and propofol for seizures  - ICU delirium precautions  - Antiemetics: Zofran prn  - Urine and serum drug tox screen  - Maintain electrolytes in normal range   Activity: [] Increase as tolerated [x] Bedrest [X] PT [X] OT [] PMNR    PULM:  - lung protective vent settings   - SAT/SBT as tolerated  - VAP protocol  - Keep plateau pressure < 30 to maintain venous drainage  - Aggressive chest PT/pulmonary toileting/NT suctioning as needed   - HOB > 45 degrees  - Aspiration precautions  - Keep SaO2 > 93%  - CT chest noted with debris in the airway; likely aspiration during seizure  - daily CXR for now    CV:  - Keep  to <150  - Telemetry monitoring  - 12-lead ECG reviewed   - TTE/2D echo  - Lipid profile    RENAL:  - Strict I/Os, daily weights  - Keep euvolemic  - Keep normonatremic   - Keep Magnesium level > 2; Potassium > 4  - Monitor lytes, replete as needed  - IVF/IVL when tolerating PO intake    GI:  Diet: Dysphagia screen and then advance diet as tolerated  GI prophylaxis [x] not indicated [] PPI [] other:  Bowel regimen [X] Miralax [X] senna [] other:    ENDO:   - Goal euglycemia (-180)  - HgA1c, TSH    HEME/ONC:  VTE prophylaxis: [X] SCDs [X] chemoprophylaxis - Lovenox 40mg  qday  - Load  x1 now, and 81 mg daily for possible dissection   - VA Duplex B/L LE    ID:  - Keep normothermic, avoid fevers  - post-op ABTx    MISC:  PT/OT/SLP    CODE STATUS:  X[] Full Code [x] DNR   Discussed in great detail     DISPOSITION:  [X] NCCU [] Stroke Unit [] Floor [] EMU [] RCU [] PCU

## 2024-09-14 NOTE — ED ADULT NURSE REASSESSMENT NOTE - NS ED NURSE REASSESS COMMENT FT1
Patient noted to be hypotensive with MAP in 50's, MD Sanchez made aware- fluids and levophed started as ordered by MD
Patient transferred back to CC room 4 from CT at approx 1330 and pt noted to be actively seizing twitching on right side of body. MD at bedside for eval and meds were given as ordered ( 2 doses of versed IV push and order of propofol IV push)
Statement Selected

## 2024-09-14 NOTE — ED ADULT TRIAGE NOTE - NS ED NURSE AMBULANCES
Quality 226: Preventive Care And Screening: Tobacco Use: Screening And Cessation Intervention: Patient screened for tobacco use and is an ex/non-smoker Detail Level: Detailed [facility]

## 2024-09-14 NOTE — ED PROCEDURE NOTE - CPROC ED ARTER LINE DETAIL1
Using sterile technique, the correct location was identified, and a needle was inserted into the artery (specify in FT)./Positive blood return was obtained via the catheter./Line was sutured in place./Hemostasis was achieved with direct pressure, and a dry sterile dressing applied./Ultrasound guidance was used.

## 2024-09-14 NOTE — ED PROVIDER NOTE - EKG/XRAY ADDITIONAL INFORMATION
Sinus tach at 117 bpm, IL interval 150, , QTc 491. Sinus tach at 117 bpm, WI interval 150, , QTc 491.  no ptx
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
## 2024-09-14 NOTE — ED PROVIDER NOTE - PROGRESS NOTE DETAILS
Dr. Jodi Roche, DO: Spoke with patient's emergency contact Jen Ohara who states she is his health care agent. She last saw him 6 days ago. She is bringing in paperwork and wants to sign another MOLST for DNR per his wishes. Unknown when his last known well was. ED Attending WENDY Berry  imaging reviewed, NCC was called, neurology also aware of results. RAVINDRA Diallo MD:  Neurosurgery consulted. Trauma cleared patient, neurocritical care at bedside, A-line was placed, blood pressure control, plan for admission.  Discussing with ICU team. ED Attending WENDY Berry  discussed with Dr. aGrcia, reports admission to Neuro ICU under Dr. Katz.

## 2024-09-14 NOTE — ED ADULT NURSE NOTE - NSFALLHARMRISKINTERV_ED_ALL_ED
Assistance OOB with selected safe patient handling equipment if applicable/Assistance with ambulation/Communicate risk of Fall with Harm to all staff, patient, and family/Monitor gait and stability/Monitor for mental status changes and reorient to person, place, and time, as needed/Move patient closer to nursing station/within visual sight of ED staff/Provide visual cue: red socks, yellow wristband, yellow gown, etc/Reinforce activity limits and safety measures with patient and family/Review medications for side effects contributing to fall risk/Bed in lowest position, wheels locked, appropriate side rails in place/Call bell, personal items and telephone in reach/Instruct patient to call for assistance before getting out of bed/chair/stretcher/Non-slip footwear applied when patient is off stretcher/[place] to call system/Physically safe environment - no spills, clutter or unnecessary equipment/Purposeful Proactive Rounding/Room/bathroom lighting operational, light cord in reach
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
## 2024-09-15 NOTE — PROGRESS NOTE ADULT - ASSESSMENT
75 year old male with history of bladder and prostate cancer s/p cystectomy and prostatectomy with removal of some lymph nodes presents with generalized weakness and black stools    # Generalized weakness and black stools likely secondary to symptomatic anemia from UGIB  - hgb 7.6, no baseline // patient states he has gotten BRBPR on and off for years however he has been having hard black stools for a week and a half // never had EGD and colonoscopy, had positive JAGUAR in the ED // no hematemesis  - BUN 50, patient states he has not been eating very much and not on diuretics, most likely secondary to UGIB  - started on protonix infusion, will keep NPO after midnight for possible EGD tomorrow  - will transfuse 1 unit prbc as requested by GI, anemia appears symptomatic  - starting LR at 60, lactate 2.8 // HD stable  - anemia microcytic, likely component of chronic GI bleed    # Hx of bladder and prostate cancer s/p cystectomy and prostatectomy  - no suprapubic pain, ostomy bag draining yellow urine, f/u PSA    # Thrombocytopenia  - splenomegaly seen on CT, thrombocytopenia likely component of consumption from blood loss and possibly sequestration  - will treat bleeding for now and monitor    # Systolic murmur  - 3/6 murmur heard on exam, patient does not follow with any doctors  - will order 2d echo to help guide fluid resuscitation, should not delay EGD  - f/u EKG    PLAN: give one unit prbc, EGD in AM    # DVT PPX: SCDs  # GI PPX: protonix drip  # Diet: Clear fluids for now  FULL CODE ASSESSMENT:  Status epilepticus   Possible L vertebral dissection   Severe carotid stenosis   HO bladder and prostate ca SP cystectomy and prostatectomy   THC abuse     PLAN:   NEURO:  - Neuro checks checks q1hrs  - CTH/CTA/CTP reviewed   - CTA with possible dissection reviewed; severe focal stenosis   - MRI / MRA brain and neck  with axial T1 fat suppression to R/O vertbral artery dissection- Non dominant  L vert along distal V1 and V2 with regions of steno-occlusion suspicion for dissection. Will obtain further detailed imaging . CTP with no region of mismatch and would place on anti- plt monotherapy at this time.   - IR eval for possible dissection   -  Continuous vEEG   - SP Keppra 3000mg in the ED  - CW keppra 1g q8h    - CW versed and propofol for seizures  - ICU delirium precautions  - Antiemetics: Zofran prn  - Urine and serum drug tox screen  - Maintain electrolytes in normal range   Activity: [] Increase as tolerated [x] Bedrest [X] PT [X] OT [] PMNR    PULM:  - lung protective vent settings   - SAT/SBT as tolerated  - VAP protocol  - Keep plateau pressure < 30 to maintain venous drainage  - Aggressive chest PT/pulmonary toileting/NT suctioning as needed   - HOB > 45 degrees  - Aspiration precautions  - Keep SaO2 > 93%  - CT chest noted with debris in the airway; likely aspiration during seizure  - daily CXR for now    CV:  - Keep  to <150  - Telemetry monitoring  - 12-lead ECG reviewed   - TTE/2D echo  - Lipid profile    RENAL:  - Strict I/Os, daily weights  - Keep euvolemic  - Keep normonatremic   - Keep Magnesium level > 2; Potassium > 4  - Monitor lytes, replete as needed  - IVF/IVL when tolerating PO intake    GI:  Diet: Dysphagia screen and then advance diet as tolerated  GI prophylaxis [x] not indicated [] PPI [] other:  Bowel regimen [X] Miralax [X] senna [] other:    ENDO:   - Goal euglycemia (-180)  - HgA1c, TSH    HEME/ONC:  VTE prophylaxis: [X] SCDs [X] chemoprophylaxis - Lovenox 40mg  qday  - Load  x1 now, and 81 mg daily for possible dissection   - VA Duplex B/L LE    ID:  - Keep normothermic, avoid fevers  - post-op ABTx    MISC:  PT/OT/SLP    CODE STATUS:  X[] Full Code [x] DNR   Discussed in great detail     DISPOSITION:  [X] NCCU [] Stroke Unit [] Floor [] EMU [] RCU [] PCU   ASSESSMENT:  Status epilepticus   Possible L vertebral dissection   Severe carotid stenosis   HO bladder and prostate ca SP cystectomy and prostatectomy   THC abuse     PLAN:   NEURO:  - Coma  checks checks q1hrs  - CTH/CTA/CTP reviewed   - CTA with possible dissection reviewed; severe focal stenosis   -  Obtain MRI / MRA brain and neck  with axial T1 fat suppression to R/O vertbral artery dissection- Non dominant  L vert along distal V1 and V2 with regions of steno-occlusion suspicion for dissection vs  severe stenosis sec to athero . Will obtain further detailed imaging . CTP with no region of mismatch and would place on anti- plt monotherapy at this time.   -  Continuous vEEG   - keppra 1g q8h    - CW versed and propofol for seizures  - ICU delirium precautions  - Antiemetics: Zofran prn  - Urine and serum drug tox screen- THC   - Maintain electrolytes in normal range   Activity:  [x] Bedrest [X] PT [X] OT     PULM: Resp failure secondary to Neurologic Injury   - lung protective vent settings   - SAT/SBT unable to perform today due to needed sedation   - VAP protocol  - Keep plateau pressure < 30 to maintain venous drainage  - Aggressive chest PT/pulmonary toileting/NT suctioning as needed   - HOB > 30 degrees  - Aspiration precautions  - Keep SaO2 > 93%  - CT chest noted   - CXR- ETT in good position ; no infiltrates    CV: RBBB- old   - Keep  to <150  - Telemetry monitoring  - 12-lead ECG   - TTE/2D echo-   - Lipid profile- 55  - Trig- 184    RENAL:  - Strict I/Os, daily weights  - Bolus 500cc X1 over 1 hr - wean off Norsynephrine   - Keep na - goal 135- 145   - Keep Magnesium level > 2; Potassium > 4  - Monitor lytes, replete as needed      GI:  Diet start feeding tonight   GI prophylaxis  [X ]  famotidine 20mg q d on MV   Bowel regimen [X] Miralax [X] senna [] other:    ENDO:   - Goal euglycemia (-180)  - HgA1c,-  TSH- P     HEME/ONC:  Repeat Coags before starting DVT prophylaxis  - PTT normal start 5K q 12   VTE prophylaxis: [X] SCDs [X] chemoprophylaxis - Heparin 5 K q 12   - S/P  Load  x1 now, and 81 mg daily for possible dissection   - VA Duplex B/L LE- P     ID: Afebrile   - Keep normothermic, avoid fevers    MISC:  PT/OT/SLP    CODE STATUS:   [x] DNR- MOLST in Chart    Discussed in great detail     DISPOSITION:  [X] NSICU

## 2024-09-15 NOTE — PATIENT PROFILE ADULT - FALL HARM RISK - HARM RISK INTERVENTIONS
Assistance with ambulation/Assistance OOB with selected safe patient handling equipment/Communicate Risk of Fall with Harm to all staff/Discuss with provider need for PT consult/Monitor gait and stability/Provide patient with walking aids - walker, cane, crutches/Reinforce activity limits and safety measures with patient and family/Review medications for side effects contributing to fall risk/Sit up slowly, dangle for a short time, stand at bedside before walking/Tailored Fall Risk Interventions/Toileting schedule using arm’s reach rule for commode and bathroom/Visual Cue: Yellow wristband and red socks/Bed in lowest position, wheels locked, appropriate side rails in place/Call bell, personal items and telephone in reach/Instruct patient to call for assistance before getting out of bed or chair/Non-slip footwear when patient is out of bed/[place] to call system/Physically safe environment - no spills, clutter or unnecessary equipment/Purposeful Proactive Rounding/Room/bathroom lighting operational, light cord in reach
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
## 2024-09-15 NOTE — CHART NOTE - NSCHARTNOTESELECT_GEN_ALL_CORE
Nursing Note by Jessica Ochoa CMA at 03/16/18 12:54 PM     Author:  Jessica Ochoa CMA Service:  (none) Author Type:  Medical Assistant     Filed:  03/16/18 12:55 PM Encounter Date:  3/16/2018 Status:  Signed     :  Jessica Ochoa CMA (Medical Assistant)            Patient escorted to[AM1.1T] exam[AM1.1M] room in stable condition. Name, birthdate and allergies verified with patient.   Concetta Thorpe was offered treatment for pain control:[AM1.1T] No.   Waiting for doctors evaluation.[AM1.1M]    Last visit with WEILER, KEITH E. was on 05/26/2017 at  5:50 PM in [facility]  Last visit with [facility] was on 05/26/2017 at  5:50 PM in [facility]  Match done based on reference date of today 3/16/18[AM1.1T]            Revision History        User Key Date/Time User Provider Type Action    > AM1.1 03/16/18 12:55 PM Jessica Ochoa CMA Medical Assistant Sign    M - Manual, T - Template             Tertiary/Event Note

## 2024-09-16 NOTE — DIETITIAN INITIAL EVALUATION ADULT - NAME AND PHONE
Jennifer [phone] or TEAMS    Nutrition Interventions: TF regimen; Nutrition Monitoring: Diet order, weights, labs, NFPF, body composition, BM and tolerance to TF regimen

## 2024-09-16 NOTE — DIETITIAN INITIAL EVALUATION ADULT - OTHER CALCULATIONS
Using ABW: ENERGY: 8207-2206 kcal/day (30-35 kcal/kg) vs. PSE 1482.25 kcal/day; PROTEIN: 68-83 g/day (1.3-1.6 g/kg); FLUID: 5088-7479 mL/day (25-30 mL/kg) -- with consideration for age, BMI, acuity of illness, intubated on propofol Ve 7.95, Tmax 37.8 C, propofol 14.14 mL/hr -- additional 373 kcal

## 2024-09-16 NOTE — DIETITIAN INITIAL EVALUATION ADULT - ADD RECOMMEND
1. Recommend to adjust TF regimen to meet estimated needs and use appropriate formula: Vital HP (pt is on propofol) via OGT, continuous 18hr feeds, total volume: 990 mL, start rate at 25 mL/hr and increase as tolerated until goal rate of 55 mL/hr is met. TF regimen + propofol (373 kcal additional) to provide -- 1363 kcal, 86.6 g pro, 827.6 mL free water + additional flushes of 50 mL q4hrs -- team to adjust water flushes prn. TF regimen meets >85  - 105% of estimated needs.     High risk; f/u in 3-5 days or prn.

## 2024-09-16 NOTE — DIETITIAN INITIAL EVALUATION ADULT - ORAL INTAKE PTA/DIET HISTORY
Pt unable to participate in nutrition assessment interview at this time; intubated. Hx limited to medical chart.

## 2024-09-16 NOTE — DIETITIAN INITIAL EVALUATION ADULT - PERTINENT MEDS FT
MEDICATIONS  (STANDING):  aspirin  chewable 81 milliGRAM(s) Oral daily  cefTRIAXone   IVPB 1000 milliGRAM(s) IV Intermittent every 24 hours  cefTRIAXone   IVPB      chlorhexidine 0.12% Liquid 15 milliLiter(s) Oral Mucosa every 12 hours  chlorhexidine 2% Cloths 1 Application(s) Topical <User Schedule>  famotidine    Tablet 20 milliGRAM(s) Oral daily  heparin   Injectable 5000 Unit(s) SubCutaneous every 12 hours  levETIRAcetam   Injectable 500 milliGRAM(s) IV Push every 12 hours  midazolam Infusion. 0.2 mG/kG/Hr (10.4 mL/Hr) IV Continuous <Continuous>  multiple electrolytes Injection Type 1 Bolus 500 milliLiter(s) IV Bolus once  niCARdipine Infusion 5 mG/Hr (25 mL/Hr) IV Continuous <Continuous>  norepinephrine Infusion 0.058 MICROgram(s)/kG/Min (5.63 mL/Hr) IV Continuous <Continuous>  petrolatum Ophthalmic Ointment 1 Application(s) Both EYES every 6 hours  polyethylene glycol 3350 17 Gram(s) Oral daily  propofol Infusion. 45 MICROgram(s)/kG/Min (14 mL/Hr) IV Continuous <Continuous>  senna 2 Tablet(s) Oral at bedtime  valproate sodium   IVPB 1250 milliGRAM(s) IV Intermittent once  valproate sodium   IVPB 500 milliGRAM(s) IV Intermittent every 8 hours    MEDICATIONS  (PRN):  acetaminophen     Tablet .. 650 milliGRAM(s) Oral every 6 hours PRN Temp greater or equal to 38C (100.4F)  fentaNYL    Injectable 25 MICROGram(s) IV Push every 2 hours PRN CPOT 6-8  fentaNYL    Injectable 12.5 MICROGram(s) IV Push every 2 hours PRN CPOT 4-5

## 2024-09-16 NOTE — DIETITIAN INITIAL EVALUATION ADULT - PERTINENT LABORATORY DATA
09-16    142  |  104  |  35<H>  ----------------------------<  116<H>  4.2   |  25  |  1.8<H>    Ca    8.4      16 Sep 2024 04:45  Phos  5.6     09-16  Mg     2.4     09-16    TPro  6.1  /  Alb  3.8  /  TBili  0.4  /  DBili  x   /  AST  52<H>  /  ALT  11  /  AlkPhos  104  09-16  A1C with Estimated Average Glucose Result: 5.3 % (09-15-24 @ 05:35)
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
## 2024-09-16 NOTE — DIETITIAN INITIAL EVALUATION ADULT - NS FNS DIET ORDER
Diet, NPO with Tube Feed:   Tube Feeding Modality: Orogastric  Jevity 1.2 Keith (JEVITY1.2RTH)  Total Volume for 24 Hours (mL): 540  Continuous  Starting Tube Feed Rate {mL per Hour}: 20  Increase Tube Feed Rate by (mL): 10     Every 4 hours  Until Goal Tube Feed Rate (mL per Hour): 30  Tube Feed Duration (in Hours): 18  Tube Feed Start Time: 12:00 (09-15-24 @ 18:59)

## 2024-09-16 NOTE — DIETITIAN INITIAL EVALUATION ADULT - OTHER INFO
Pertinent Medical Information: Pt is a 78 y/o male w/ PMHx of bladder and prostate cancer s/p cystectomy and prostatectomy with removal of some lymph nodes presents as prenotification for trauma. s/p stroke code called 9/14. s/p intubated 9/16. Ve 7.95, Tmax 37.8 C, propofol 14.14 mL/hr -- additional 373 kcal.    Weight hx: UBW unknown. Current dosing weight is 52 KG. Previous admission weight was 56.7 KG on 9/29/22 per EMR. 8% unintentional weight loss in over 1 years compared to current dosing weight. Pt does not meet weight criteria for malnutrition at this time.
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
## 2024-09-16 NOTE — DIETITIAN INITIAL EVALUATION ADULT - NSFNSGIIOFT_GEN_A_CORE
Last BM not documented; GI: WDL per flow sheet    09-15-24 @ 07:01  -  09-16-24 @ 07:00  --------------------------------------------------------  OUT:  Total OUT: 0 mL    Total NET: 260 mL

## 2024-09-16 NOTE — PROGRESS NOTE ADULT - ASSESSMENT
ASSESSMENT:  Status epilepticus   Possible L vertebral dissection   Severe carotid stenosis   HO bladder and prostate ca SP cystectomy and prostatectomy   THC abuse     PLAN:   NEURO:  - Coma  checks checks q1hrs  - CTH/CTA/CTP reviewed   - MRI/MRA as above  -  Obtain MRI / MRA brain and neck  with axial T1 fat suppression to R/O vertbral artery dissection-  - Continue DAPT therapy  -  Continuous vEEG   - keppra 1g q8h    - CW versed and propofol for seizures  - ICU delirium precautions  - Antiemetics: Zofran prn  - Urine and serum drug tox screen- THC   - Maintain electrolytes in normal range   Activity:  [x] Bedrest [X] PT [X] OT     PULM: Resp failure secondary to Neurologic Injury   - lung protective vent settings   - SAT/SBT unable to perform today due to needed sedation   - VAP protocol  - Keep plateau pressure < 30 to maintain venous drainage  - Aggressive chest PT/pulmonary toileting/NT suctioning as needed   - HOB > 30 degrees  - Aspiration precautions  - Keep SaO2 > 93%  - CT chest noted   - CXR- ETT in good position ; no infiltrates    CV: RBBB- old   - Keep  to <150  - Telemetry monitoring  - 12-lead ECG   - TTE/2D echo-   - Lipid profile- 55  - Trig- 184    RENAL:  - Strict I/Os, daily weights  - Bolus 500cc X1 over 1 hr - wean off Norsynephrine   - Keep na - goal 135- 145   - Keep Magnesium level > 2; Potassium > 4  - Monitor lytes, replete as needed      GI:  Diet start feeding tonight   GI prophylaxis  [X ]  famotidine 20mg q d on MV   Bowel regimen [X] Miralax [X] senna [] other:    ENDO:   - Goal euglycemia (-180)  - HgA1c,-  TSH- P     HEME/ONC:  Repeat Coags before starting DVT prophylaxis  - PTT normal start 5K q 12   VTE prophylaxis: [X] SCDs [X] chemoprophylaxis - Heparin 5 K q 12   - S/P  Load  x1 now, and 81 mg daily for possible dissection   - VA Duplex B/L LE- P     ID: Afebrile   - Keep normothermic, avoid fevers    MISC:  PT/OT/SLP    CODE STATUS:   [x] DNR- MOLST in Chart    Discussed in great detail     DISPOSITION:  [X] NSICU   ASSESSMENT:  Status epilepticus   Possible L vertebral dissection   Severe carotid stenosis   HO bladder and prostate ca SP cystectomy and prostatectomy   THC abuse   NORSE    PLAN:   NEURO:  - Coma  checks checks q1hrs  - MRI/MRA as above  - DAPT-  PRU for  L VA dissection with segmental occlusion   - Add VA- load 1250mg now and 500mg q8 IV  - Check Trough before third dose - monitor LFT/ NH3 level    - Will need LP - send paraneoplastic / autoimmune test / Cultures , IgG index, Cultyres , cell count , protein , glucose   -  Continuous vEEG   - keppra 500mg q 12 h  based on CrCl 25   - CW versed and propofol for seizures- increased versed to .2 mg/kg/hr, propofol 45mc/kg/min   - ICU delirium precautions  - Antiemetics: Zofran prn  - Urine and serum drug tox screen- THC   - Maintain electrolytes in normal range   Activity:  [x] Bedrest [X] PT [X] OT     PULM: Resp failure secondary to Neurologic Injury - Resp acidosis   - Increase TV to 500cc  - LA - 1.5  - lung protective vent settings   - SAT/SBT unable to perform  due to needed sedation   - VAP protocol  - Keep plateau pressure < 30 to maintain venous drainage  - Aggressive chest PT/pulmonary toileting/NT suctioning as needed   - HOB > 30 degrees  - Aspiration precautions  - Keep SaO2 > 93%  - CT chest noted   - CXR- ETT in good position ; no infiltrates    CV: RBBB- old ,   - Keep  to <150  - Norepinephrine to maintain SBP goal   - Bolus X1 - Normasol   - Telemetry monitoring  - 12-lead ECG   - TTE/2D echo-   -TTE Echo Complete w/o Contrast w/ Doppler (09.15.24 @ 11:31) >  Summary:   1. Left ventricular ejection fraction,by visual estimation, is 50 to   55%.   2. LV Ejection Fraction by Strickland's Method.   3. Mild concentric left ventricular hypertrophy.   4. Spectral Doppler shows impaired relaxation pattern of left   ventricular myocardial filling (Grade I diastolic dysfunction).   5. No evidence of mitral valve regurgitation.   6. Sclerotic aortic valve with normal opening.  - Lipid profile- 55  - Trig- 184    RENAL: CRI   - Strict I/Os, daily weights  - Keep na - goal 135- 145   - Keep Magnesium level > 2; Potassium > 4  - Monitor lytes, replete as needed      GI:  Diet start feeding tonight   GI prophylaxis  [X ]  famotidine 20mg q d on MV   Bowel regimen [X] Miralax [X] senna [] other:    ENDO:   - Goal euglycemia (-180)  - HgA1c,-5.3  TSH- P    HEME/ONC:  - Coags nl   VTE prophylaxis: [X] SCDs [X] chemoprophylaxis - Heparin 5 K q 12   - On DAPT therapy hold Plavix - Check PRU before LP    - VA Duplex B/L LE- neg- 9/15/24     ID: Afebrile   - Keep normothermic, avoid fevers    MISC:  PT/OT/SLP    CODE STATUS:   [x] DNR/ DNI  MOLST in Chart    Discussed in great detail     DISPOSITION:  [X] NSICU   Yes
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
## 2024-09-17 NOTE — PROGRESS NOTE ADULT - ASSESSMENT
ASSESSMENT:  Status epilepticus   Possible L vertebral dissection   Severe carotid stenosis   HO bladder and prostate ca SP cystectomy and prostatectomy   THC abuse   NORSE    PLAN:   NEURO:  - pupil checks q1hrs  - MRI w/w/o  - ASA/plavix for L vert V1-2 dissection, however pending LP, so ASA continue, plavix last dose given 9/16, check PRU in pm   - VPA level higher calculated free 28, therapeutic range 5-15, elevated ammonia as well, so decrease to 500mg Q12  - Will need LP - send paraneoplastic / autoimmune test / Cultures , IgG index, Cultyres , cell count , protein , glucose   -  Continuous vEEG --reports status as of 9/17 am   - CW versed and propofol for seizures- increased versed to .2 mg/kg/hr, propofol 45mc/kg/min   - AED: VPA 500mg Q12, biriviact 100mg Q8, d/c keppra, load vimpat 200mg IV, check EKG, cont vimpat 100mg Q12 if no arrhythmias   - Antiemetics: Zofran prn  - Urine and serum drug tox screen- THC   - Maintain electrolytes in normal range   Activity:  [x] Bedrest [] PT [] OT     PULM: Resp failure secondary to Neurologic Injury - Resp acidosis   16/500/8/40%-->PEEP to 5  - lung protective vent settings   - SAT/SBT unable to perform  due to needed sedation   - VAP protocol  - Keep plateau pressure < 30 to maintain venous drainage  - Aggressive chest PT/pulmonary toileting/NT suctioning as needed   - HOB > 30 degrees  - Aspiration precautions  - Keep SaO2 > 93%  - CT chest noted   - CXR- ETT in good position ; no infiltrates    CV: RBBB- old ,   - Keep  to <150  - Norepinephrine to maintain SBP goal   - Bolus X1 - Normasol   - Telemetry monitoring  - 12-lead ECG   - TTE/2D echo-   -TTE Echo Complete w/o Contrast w/ Doppler (09.15.24 @ 11:31) >  Summary:   1. Left ventricular ejection fraction,by visual estimation, is 50 to   55%.   2. LV Ejection Fraction by Strickland's Method.   3. Mild concentric left ventricular hypertrophy.   4. Spectral Doppler shows impaired relaxation pattern of left   ventricular myocardial filling (Grade I diastolic dysfunction).   5. No evidence of mitral valve regurgitation.   6. Sclerotic aortic valve with normal opening.  - Lipid profile- 55  - Trig- 184    RENAL: CRI   - Strict I/Os, daily weights  - Keep na - goal 135- 145   - Keep Magnesium level > 2; Potassium > 4  - Monitor lytes, replete as needed      GI:  cont tube feeding   GI prophylaxis  [X ]  famotidine 20mg q d on MV   Bowel regimen [X] Miralax [X] senna [] other:  LBM PTA, add edema     ENDO:   - Goal euglycemia (-180)  - HgA1c,-5.3  TSH pending    HEME/ONC:  - Coags nl   VTE prophylaxis: [X] SCDs [X] chemoprophylaxis - Heparin 5 K q 12   - On DAPT therapy hold Plavix - Check PRU before LP    - VA Duplex B/L LE- neg- 9/15/24     ID: Afebrile   - Keep normothermic, avoid fevers    MISC:  PT/OT/SLP    CODE STATUS:   [x] DNR/ intubate  MOLST in Chart    Discussed in great detail     DISPOSITION:  [X] NSICU
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
## 2024-09-17 NOTE — CONSULT NOTE ADULT - CONVERSATION DETAILS
Jen Ohara knows that Mr. Sy would not want to be on prolonged ventilation and would never want a feeding tube. Would like to see if he can make it through this period. Understands his current condition is critical but may be reversible. DNR but full medical management otherwise at this time.
Per primary team, patient's HCP requested palliative consultation. Discussed with Jen at bedside. She states that given the severity of patient's illness, he would not want continued life-prolonging interventions (including mechanical ventilation and artificial nutrition/hydration), as he has always been someone who valued his functional ability and independence.     She inquires about stopping all of patient's current medications and for him to be started on medications to hasten the dying process. I explained the benefit of continuing seizure medications as this can be considered a palliative measure. I also explained that we can discontinue life-prolonging therapies and to aggressively treat symptoms, but we can not provide medications with the intent of hastening the dying process if this amount of medication is not needed for comfort. She is understanding. She would like to see if patient's biological son would want to visit with the patient prior to determining timing for CMO.

## 2024-09-18 NOTE — OCCUPATIONAL THERAPY INITIAL EVALUATION ADULT - SPECIFY REASON(S)
Attempted to see pt for OT evaluation, pt remains intubated and sedated at this time, will hold and follow up when cleared
Case discussed in Neurocrit IDT rounds, per team, pt not medically cleared to participate in OT today. Will hold and initiate OT when pt medically cleared.
Case discussed in neurocrit round, pt to be transitioned to CMO, d/c OT

## 2024-09-18 NOTE — PROGRESS NOTE ADULT - SUBJECTIVE AND OBJECTIVE BOX
NSICU Progress Note     BEN CACERES 79y Male [id number]  Hospital Day: 4d     HPI  HPI:      PMH  Vertebral artery dissection    Hypertension    Bladder cancer    H/O prostatectomy    H/O total cystectomy        Vital Signs  T(F): 92.4 (08:00), Max: 98.4 (16:00)  HR: 62 (12:00) (46 - 88)  BP: --  RR: 16 (12:00) (16 - 19)  SpO2: 100% (12:00) (99% - 100%)    Neurological Exam:   Constitutional: No Acute Distress , Coma checks     Neurological:  pupils 2mm reactive     Motor exam: No motor movement                                               Pulmonary: Clear to Auscultation, No rales, No rhonchi, No wheezes     Cardiovascular: S1, S2, Regular rate and rhythm     Gastrointestinal: Soft, Non-tender, Non-distended     Extremities: No calf tenderness       Labs:  WBC 7.74 /HGB 10.2 /MCV 80.6 /HCT 30.7 /PLT 89 /     141  |  105  |  43[H]  ----------------------------<  96  3.8   |  20  |  1.8[H]    Ca    8.4      17 Sep 2024 04:55  Phos  3.2       Mg     2.4         TPro  5.5[L]  /  Alb  3.1[L]  /  TBili  0.4  /  DBili  x   /  AST  32  /  ALT  10  /  AlkPhos  119[H]      LIVER FUNCTIONS - ( 17 Sep 2024 04:55 )  Alb: 3.1 g/dL / Pro: 5.5 g/dL / ALK PHOS: 119 U/L / ALT: 10 U/L / AST: 32 U/L / GGT: x             Urinalysis Basic - ( 17 Sep 2024 10:47 )    Color: Yellow / Appearance: Turbid / S.022 / pH: x  Gluc: x / Ketone: Negative mg/dL  / Bili: Negative / Urobili: 1.0 mg/dL   Blood: x / Protein: 100 mg/dL / Nitrite: Negative   Leuk Esterase: Trace / RBC: 5 /HPF / WBC 9 /HPF   Sq Epi: x / Non Sq Epi: 2 /HPF / Bacteria: Moderate /HPF        Medications:  acetaminophen     Tablet .. 650 milliGRAM(s) Oral every 6 hours PRN  chlorhexidine 2% Cloths 1 Application(s) Topical <User Schedule>  HYDROmorphone  Injectable 1 milliGRAM(s) IV Push every 10 minutes PRN  lacosamide Solution 100 milliGRAM(s) Oral two times a day  levETIRAcetam   Injectable 500 milliGRAM(s) IV Push every 12 hours  LORazepam   Injectable 2 milliGRAM(s) IV Push every 10 minutes PRN  midazolam Infusion. 0.2 mG/kG/Hr IV Continuous <Continuous>  valproate sodium   IVPB 500 milliGRAM(s) IV Intermittent every 12 hours      Neuroimaging:

## 2024-09-18 NOTE — OCCUPATIONAL THERAPY INITIAL EVALUATION ADULT - PATIENT PROFILE REVIEW, REHAB EVAL
Iraj Attempted: 09:15am; pt chart thoroughly reviewed prior to OT evaluation/yes
Eval Attempted: 11:08 am; pt chart thoroughly reviewed prior to OT evaluation/yes
Litaal Attempted: 09:30am; pt chart thoroughly reviewed prior to OT evaluation/yes

## 2024-09-18 NOTE — PROGRESS NOTE ADULT - PROBLEM SELECTOR PLAN 1
continue Versed  Ativan 2 mg prior to extubation and Q 10 min PRN thereafter for seizure activity/agitation
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
## 2024-09-18 NOTE — PROGRESS NOTE ADULT - PROBLEM SELECTOR PLAN 2
- no labs  - no IVF  - no artificial feeding  - no vitals  - no pulse ox  - no 02 supplementation  - no injections  - no FS    Dilaudid 1 mg IVP Q 10 min PRN for dyspnea or pain  Ativan PRN as above  Continue all AED meds - discontinued propofol prior to extubation
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
## 2024-09-18 NOTE — DISCHARGE NOTE FOR THE EXPIRED PATIENT - SECONDARY DIAGNOSIS.
Multiple skin tears Vertebral artery dissection Acute respiratory failure with hypoxia and hypercapnia Status epilepticus

## 2024-09-18 NOTE — EEG REPORT - NS EEG TEXT BOX
Epilepsy Attending Note:     BEN CACERES    79y Male  MRN [id number]    Vital Signs Last 24 Hrs  T(C): 35.1 (17 Sep 2024 08:00), Max: 36.7 (16 Sep 2024 20:00)  T(F): 95.2 (17 Sep 2024 08:00), Max: 98.1 (16 Sep 2024 20:00)  HR: 60 (17 Sep 2024 08:00) (51 - 78)  BP: --  BP(mean): --  RR: 17 (17 Sep 2024 08:00) (16 - 21)  SpO2: 100% (17 Sep 2024 08:00) (100% - 100%)    Parameters below as of 17 Sep 2024 08:00  Patient On (Oxygen Delivery Method): ventilator    O2 Concentration (%): 40                          10.2   7.74  )-----------( 89       ( 17 Sep 2024 04:55 )             30.7           141  |  105  |  43[H]  ----------------------------<  96  3.8   |  20  |  1.8[H]    Ca    8.4      17 Sep 2024 04:55  Phos  3.2       Mg     2.4         TPro  5.5[L]  /  Alb  3.1[L]  /  TBili  0.4  /  DBili  x   /  AST  32  /  ALT  10  /  AlkPhos  119[H]        MEDICATIONS  (STANDING):  aspirin  chewable 81 milliGRAM(s) Oral daily  cefTRIAXone   IVPB 1000 milliGRAM(s) IV Intermittent every 24 hours  cefTRIAXone   IVPB      chlorhexidine 0.12% Liquid 15 milliLiter(s) Oral Mucosa every 12 hours  chlorhexidine 2% Cloths 1 Application(s) Topical <User Schedule>  famotidine    Tablet 20 milliGRAM(s) Oral daily  heparin   Injectable 5000 Unit(s) SubCutaneous every 12 hours  levETIRAcetam   Injectable 500 milliGRAM(s) IV Push every 12 hours  midazolam Infusion. 0.2 mG/kG/Hr (10.4 mL/Hr) IV Continuous <Continuous>  niCARdipine Infusion 5 mG/Hr (25 mL/Hr) IV Continuous <Continuous>  norepinephrine Infusion 0.058 MICROgram(s)/kG/Min (5.63 mL/Hr) IV Continuous <Continuous>  petrolatum Ophthalmic Ointment 1 Application(s) Both EYES every 6 hours  polyethylene glycol 3350 17 Gram(s) Oral daily  propofol Infusion. 45 MICROgram(s)/kG/Min (14 mL/Hr) IV Continuous <Continuous>  senna 2 Tablet(s) Oral at bedtime  valproate sodium   IVPB 500 milliGRAM(s) IV Intermittent every 8 hours    MEDICATIONS  (PRN):  acetaminophen     Tablet .. 650 milliGRAM(s) Oral every 6 hours PRN Temp greater or equal to 38C (100.4F)  fentaNYL    Injectable 25 MICROGram(s) IV Push every 2 hours PRN CPOT 6-8  fentaNYL    Injectable 12.5 MICROGram(s) IV Push every 2 hours PRN CPOT 4-5      Valproic Acid Level, Serum: 81.0 ug/mL [50.0 - 100.0] (24 @ 04:55)        VEEG in the last 24 hours:    Background - burst suppression, asymmetrical with higher amplitude form the left side.  Bursts lasting for 1 to less than 10 seconds. Up to 4 pm the bursts consisted of sharply contoured semirhythmic theta admixed with spikes, mainly over left hemisphere.  After 4 pm the bursts consisted of either generalized spikes or rhythmic >2.5 Hz spikes/polyspikes consistent with an ictal build up pattern.    Focal and generalized slowin. severe generalized slowing  2. left hemispheric focal slowing    Interictal activity - as above    Events - none    Seizures - as above    Impression: Abnormal VEEG as above    Plan - discussed with NCC team    
Epilepsy Attending Note:     BEN CACERES    79y Male  MRN [id number]    Vital Signs Last 24 Hrs  T(C): 37.8 (16 Sep 2024 08:00), Max: 38.8 (15 Sep 2024 20:00)  T(F): 100.1 (16 Sep 2024 08:00), Max: 101.9 (15 Sep 2024 20:00)  HR: 78 (16 Sep 2024 10:00) (67 - 86)  BP: --  BP(mean): --  RR: 21 (16 Sep 2024 10:00) (20 - 34)  SpO2: 100% (16 Sep 2024 10:00) (98% - 100%)    Parameters below as of 16 Sep 2024 06:00  Patient On (Oxygen Delivery Method): ventilator    O2 Concentration (%): 40                          11.2   8.51  )-----------( 91       ( 16 Sep 2024 04:45 )             34.7       09-16    142  |  104  |  35<H>  ----------------------------<  116<H>  4.2   |  25  |  1.8<H>    Ca    8.4      16 Sep 2024 04:45  Phos  5.6     09-16  Mg     2.4     09-16    TPro  6.1  /  Alb  3.8  /  TBili  0.4  /  DBili  x   /  AST  52<H>  /  ALT  11  /  AlkPhos  104  09-16      MEDICATIONS  (STANDING):  aspirin  chewable 81 milliGRAM(s) Oral daily  cefTRIAXone   IVPB 1000 milliGRAM(s) IV Intermittent every 24 hours  cefTRIAXone   IVPB      chlorhexidine 0.12% Liquid 15 milliLiter(s) Oral Mucosa every 12 hours  chlorhexidine 2% Cloths 1 Application(s) Topical <User Schedule>  clopidogrel Tablet 75 milliGRAM(s) Enteral Tube daily  famotidine    Tablet 20 milliGRAM(s) Oral daily  heparin   Injectable 5000 Unit(s) SubCutaneous every 12 hours  levETIRAcetam  IVPB 1000 milliGRAM(s) IV Intermittent <User Schedule>  midazolam Infusion 0.1 mG/kG/Hr (6 mL/Hr) IV Continuous <Continuous>  niCARdipine Infusion 5 mG/Hr (25 mL/Hr) IV Continuous <Continuous>  norepinephrine Infusion 0.058 MICROgram(s)/kG/Min (5.63 mL/Hr) IV Continuous <Continuous>  petrolatum Ophthalmic Ointment 1 Application(s) Both EYES every 6 hours  polyethylene glycol 3350 17 Gram(s) Oral daily  propofol Infusion 10 MICROgram(s)/kG/Min (3.6 mL/Hr) IV Continuous <Continuous>  senna 2 Tablet(s) Oral at bedtime  sodium chloride 0.9% Bolus 250 milliLiter(s) IV Bolus once    MEDICATIONS  (PRN):  acetaminophen     Tablet .. 650 milliGRAM(s) Oral every 6 hours PRN Temp greater or equal to 38C (100.4F)  fentaNYL    Injectable 25 MICROGram(s) IV Push every 2 hours PRN CPOT 6-8  fentaNYL    Injectable 12.5 MICROGram(s) IV Push every 2 hours PRN CPOT 4-5            VEEG in the last 24 hours:    Background------------ Nearly continuous , asymmetrical with higher amplitude from the left side  and better level of organization from the right. It reaches frequencies in the range of 6-7 hz .                                   The BG shows brief 2-3 seconds of diffuse suppression that is more evident during early porions of the recording     Focal and generalized slowing-----1-  left hemispheric focal slowing 2- left hemispheric focal slowing    Interictal activity------------  1- frequent left Temporo central sharp waves and spikes that specially during the early portions of the recording appear in periodic pattern    Events----none    Seizures---During the early potions of the recording there are cs of electrographic events characterized by rhythmic mainly left temporal sharply contoured theta admixed with low amplitude spikes that would last for more 10-20 seconds followed by periodic discharges  and                          suppression of the BG     Impression: abnormal as above    Plan - as/NCC team    
Epilepsy Attending Note:     BEN CACERES    79y Male  MRN [id number]    Vital Signs Last 24 Hrs  T(C): 33.5 (18 Sep 2024 08:00), Max: 37 (17 Sep 2024 12:00)  T(F): 92.4 (18 Sep 2024 08:00), Max: 98.6 (17 Sep 2024 12:00)  HR: 46 (18 Sep 2024 08:15) (46 - 88)  BP: --  BP(mean): --  RR: 16 (18 Sep 2024 08:15) (16 - 17)  SpO2: 100% (18 Sep 2024 08:15) (99% - 100%)    Parameters below as of 18 Sep 2024 08:00  Patient On (Oxygen Delivery Method): ventilator    O2 Concentration (%): 40                          10.2   7.74  )-----------( 89       ( 17 Sep 2024 04:55 )             30.7           141  |  105  |  43[H]  ----------------------------<  96  3.8   |  20  |  1.8[H]    Ca    8.4      17 Sep 2024 04:55  Phos  3.2       Mg     2.4         TPro  5.5[L]  /  Alb  3.1[L]  /  TBili  0.4  /  DBili  x   /  AST  32  /  ALT  10  /  AlkPhos  119[H]  -17      MEDICATIONS  (STANDING):  aspirin  chewable 81 milliGRAM(s) Oral daily  cefTRIAXone   IVPB 1000 milliGRAM(s) IV Intermittent every 24 hours  cefTRIAXone   IVPB      chlorhexidine 0.12% Liquid 15 milliLiter(s) Oral Mucosa every 12 hours  chlorhexidine 2% Cloths 1 Application(s) Topical <User Schedule>  famotidine    Tablet 20 milliGRAM(s) Oral daily  heparin   Injectable 5000 Unit(s) SubCutaneous every 12 hours  lacosamide Solution 100 milliGRAM(s) Oral two times a day  levETIRAcetam   Injectable 500 milliGRAM(s) IV Push every 12 hours  midazolam Infusion. 0.2 mG/kG/Hr (10.4 mL/Hr) IV Continuous <Continuous>  niCARdipine Infusion 5 mG/Hr (25 mL/Hr) IV Continuous <Continuous>  norepinephrine Infusion 0.058 MICROgram(s)/kG/Min (5.63 mL/Hr) IV Continuous <Continuous>  petrolatum Ophthalmic Ointment 1 Application(s) Both EYES every 6 hours  polyethylene glycol 3350 17 Gram(s) Oral every 12 hours  propofol Infusion. 45 MICROgram(s)/kG/Min (14 mL/Hr) IV Continuous <Continuous>  senna 2 Tablet(s) Oral at bedtime  valproate sodium   IVPB 500 milliGRAM(s) IV Intermittent every 12 hours    MEDICATIONS  (PRN):  acetaminophen     Tablet .. 650 milliGRAM(s) Oral every 6 hours PRN Temp greater or equal to 38C (100.4F)  fentaNYL    Injectable 12.5 MICROGram(s) IV Push every 2 hours PRN CPOT 4-5  fentaNYL    Injectable 25 MICROGram(s) IV Push every 2 hours PRN CPOT 6-8      Valproic Acid Level, Serum: 81.0 ug/mL [50.0 - 100.0] (24 @ 04:55)        VEEG in the last 24 hours:    Background - burst suppression pattern. Bursts are seen with higher amplitude from the left hemisphere lasting for 1-10 seconds, consisting of 2-3 Hz rhythmic spikes (ictal/interictal pattern) or purely ictal pattern (more than 3 Hz and evolving)    Focal and generalized slowin. severe generalized slowing  2. left hemispheric focal slowing    Interictal activity - as above    Events - none    Seizures - as above    Impression: Abnormal VEEG as above    Plan - per NCC team    
[place] Department of Neurology  Inpatient Continuous video-EEG Report      Patient Name:	BEN CACERES    :	1945  MRN:	-  Study Date/Time:	2024, 5:40:48 PM  Referred by:	-    Brief Clinical History:  BEN CACERES is a 79 year old Male; study performed to investigate for seizures or markers of epilepsy.   Diagnosis Code:  R56.9 convulsions/seizure    Patient Medication:  Keppra  Versed  Propofol    Acquisition Details:  Electroencephalography was acquired using a minimum of 21 channels on an [employer] system v 8.5.1 with electrode placement according to the standard International 10-20 system following ACNS (American Clinical Neurophysiology Society) guidelines for Long-Term Video EEG monitoring.  Anterior temporal T1 and T2 electrodes were utilized whenever possible. The [employer] automated spike & seizure detections were all reviewed in detail, in addition to extensive portions of raw EEG.    Day1: 2024 @ 5:40:48 PM to next morning @ 09:00 am  Background:  continuous.   Symmetry:  Higher amplitude LEFT hemisphere   Posterior Dominant Rhythm:  7-8 Hz symmetric, well-organized, and well-modulated  Organization: Rudimentary  Voltage:  Normal (20uV)  Variability:  Yes	Reactivity:  No  Sleep:  Absent.  Focal abnormalities:  Frequent (10-49%)  Interictal Activity: Epileptiform sharp waves  Location:  Left Centrotemporal  Focal Slowing:  Left Hemispheric  Generalized Slowing:  Moderate  Events: No electrographic seizures or significant clinical events.  Provocations: Hyperventilation and Photic stimulation:  was not performed.  Daily Impression:  Abnormal due to generalized and focal LEFT hemispheric slowing consistent with underlying dysfunction.  Epileptiform  activity and no significant clinical events occurred.      Kya Waters MD  Attending Neurologist, Division of Epilepsy

## 2024-09-18 NOTE — PROGRESS NOTE ADULT - CONVERSATION DETAILS
Spoke with spouse at bedside. She would like to proceed with palliative extubation. She reports that the two of them had numerous discussions re: his wishes and he would want to be taken off of life support and allowed to pass away comfortably. Explained CMO and palliative withdrawal in detail all of which she agrees to.

## 2024-09-19 NOTE — PHYSICAL THERAPY INITIAL EVALUATION ADULT - SPECIFY REASON(S)
Hold PT at this time. Pt. currently intubated and sedated. Not yet cleared for therapy. Will f/u with PT as appropriate.
Hold and d/c PT at this time. Pt. is now receiving CMO. D/c PT now.

## 2024-09-20 LAB
1OH-MIDAZOLAM UR CFM-MCNC: 8913 NG/ML — HIGH
7AMINOCLONAZEPAM UR CFM-MCNC: NEGATIVE NG/ML — SIGNIFICANT CHANGE UP
ALPHA-HYDROXYALPRAZOLAM, UR RESULT: NEGATIVE NG/ML — SIGNIFICANT CHANGE UP
ALPRAZ UR CFM-MCNC: NEGATIVE NG/ML — SIGNIFICANT CHANGE UP
BENZODIAZ UR QL SCN: POSITIVE
CLONAZEPAM UR CFM-MCNC: NEGATIVE NG/ML — SIGNIFICANT CHANGE UP
DIAZEPAM UR CFM-MCNC: NEGATIVE NG/ML — SIGNIFICANT CHANGE UP
FLUNITRAZEPAM UR CFM-MCNC: NEGATIVE NG/ML — SIGNIFICANT CHANGE UP
FLURAZEPAM UR CFM-MCNC: NEGATIVE NG/ML — SIGNIFICANT CHANGE UP
LORAZEPAM UR CFM-MCNC: NEGATIVE NG/ML — SIGNIFICANT CHANGE UP
MIDAZOLAM UR CFM-MCNC: NEGATIVE NG/ML — SIGNIFICANT CHANGE UP
NORDIAZEPAM, UR RESULT: NEGATIVE NG/ML — SIGNIFICANT CHANGE UP
OXAZEPAM UR QL SCN: NEGATIVE NG/ML — SIGNIFICANT CHANGE UP
TEMAZEPAM UR CFM-MCNC: NEGATIVE NG/ML — SIGNIFICANT CHANGE UP

## 2024-09-25 DIAGNOSIS — I77.74 DISSECTION OF VERTEBRAL ARTERY: ICD-10-CM

## 2024-09-25 DIAGNOSIS — Z86.19 PERSONAL HISTORY OF OTHER INFECTIOUS AND PARASITIC DISEASES: ICD-10-CM

## 2024-09-25 DIAGNOSIS — G40.901 EPILEPSY, UNSPECIFIED, NOT INTRACTABLE, WITH STATUS EPILEPTICUS: ICD-10-CM

## 2024-09-25 DIAGNOSIS — Z51.5 ENCOUNTER FOR PALLIATIVE CARE: ICD-10-CM

## 2024-09-25 DIAGNOSIS — I10 ESSENTIAL (PRIMARY) HYPERTENSION: ICD-10-CM

## 2024-09-25 DIAGNOSIS — J96.02 ACUTE RESPIRATORY FAILURE WITH HYPERCAPNIA: ICD-10-CM

## 2024-09-25 DIAGNOSIS — I65.22 OCCLUSION AND STENOSIS OF LEFT CAROTID ARTERY: ICD-10-CM

## 2024-09-25 DIAGNOSIS — Z85.51 PERSONAL HISTORY OF MALIGNANT NEOPLASM OF BLADDER: ICD-10-CM

## 2024-09-25 DIAGNOSIS — Z79.899 OTHER LONG TERM (CURRENT) DRUG THERAPY: ICD-10-CM

## 2024-09-25 DIAGNOSIS — Z66 DO NOT RESUSCITATE: ICD-10-CM

## 2024-09-25 DIAGNOSIS — J96.01 ACUTE RESPIRATORY FAILURE WITH HYPOXIA: ICD-10-CM

## 2024-09-25 DIAGNOSIS — I45.10 UNSPECIFIED RIGHT BUNDLE-BRANCH BLOCK: ICD-10-CM

## 2024-09-25 DIAGNOSIS — Z90.6 ACQUIRED ABSENCE OF OTHER PARTS OF URINARY TRACT: ICD-10-CM

## 2024-09-25 DIAGNOSIS — Z93.6 OTHER ARTIFICIAL OPENINGS OF URINARY TRACT STATUS: ICD-10-CM

## 2024-09-25 DIAGNOSIS — F12.10 CANNABIS ABUSE, UNCOMPLICATED: ICD-10-CM
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
## 2024-09-25 DIAGNOSIS — E83.42 HYPOMAGNESEMIA: ICD-10-CM
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
